# Patient Record
Sex: FEMALE | Race: WHITE | Employment: UNEMPLOYED | URBAN - METROPOLITAN AREA
[De-identification: names, ages, dates, MRNs, and addresses within clinical notes are randomized per-mention and may not be internally consistent; named-entity substitution may affect disease eponyms.]

---

## 2023-07-10 ENCOUNTER — ANCILLARY PROCEDURE (OUTPATIENT)
Dept: OBGYN CLINIC | Age: 33
End: 2023-07-10
Payer: COMMERCIAL

## 2023-07-10 ENCOUNTER — INITIAL PRENATAL (OUTPATIENT)
Dept: OBGYN CLINIC | Age: 33
End: 2023-07-10
Payer: COMMERCIAL

## 2023-07-10 VITALS
DIASTOLIC BLOOD PRESSURE: 74 MMHG | SYSTOLIC BLOOD PRESSURE: 113 MMHG | HEART RATE: 98 BPM | WEIGHT: 216 LBS | BODY MASS INDEX: 35.99 KG/M2 | HEIGHT: 65 IN

## 2023-07-10 DIAGNOSIS — Z34.90 FOURTH PREGNANCY: ICD-10-CM

## 2023-07-10 DIAGNOSIS — Z34.90 PREGNANCY, UNSPECIFIED GESTATIONAL AGE: Primary | ICD-10-CM

## 2023-07-10 DIAGNOSIS — Z3A.08 8 WEEKS GESTATION OF PREGNANCY: ICD-10-CM

## 2023-07-10 DIAGNOSIS — O09.891 HX OF PRETERM DELIVERY, CURRENTLY PREGNANT, FIRST TRIMESTER: ICD-10-CM

## 2023-07-10 PROCEDURE — 99203 OFFICE O/P NEW LOW 30 MIN: CPT | Performed by: OBSTETRICS & GYNECOLOGY

## 2023-07-10 PROCEDURE — 76801 OB US < 14 WKS SINGLE FETUS: CPT | Performed by: OBSTETRICS & GYNECOLOGY

## 2023-07-10 PROCEDURE — 99999 PR OFFICE/OUTPT VISIT,PROCEDURE ONLY: CPT | Performed by: OBSTETRICS & GYNECOLOGY

## 2023-07-10 NOTE — PROGRESS NOTES
[address] FETAL MEDICINE  [facility] [place] [address]  Dept: [phone]  Loc: [phone]     7/10/2023    RE:  Hilda Baron     : 1990   AGE: 29 y.o. Dear Dr. Mendoza Alanis,    Thank you for allowing me to see Hilda Baron. As I'm sure you will recall, Hilda Baron is a 29 y.o. [id number] last menstrual period was 2023. Estimated Date of Delivery: 24 at [address] [place] [address] seen in our office today for the following:    REASON FOR VISIT: Viability    Patient Active Problem List    Diagnosis Date Noted    8 weeks gestation of pregnancy 07/10/2023    Fourth pregnancy 07/10/2023    Hx of  delivery, currently pregnant, first trimester 07/10/2023        PAST HISTORY:  OB History    Para Term  AB Living   4 2 1 1 1 2   SAB IAB Ectopic Molar Multiple Live Births   1         2      # Outcome Date GA Lbr Kentrell/2nd Weight Sex Delivery Anes PTL Lv   4 Current            3  09/15/19 35w0d  5 lb 11 oz (2.58 kg) M Vag-Spont   DIEGO   2 SAB 2019           1 Term 09/29/15   7 lb 5.6 oz (3.334 kg) F Vag-Spont   DIEGO          MEDICAL:  Past Medical History:   Diagnosis Date    Asthma     Infertility, female     Postpartum depression      delivery 9/15/19    Delivered second pregnancy at 35 weeks.  labor     Rh incompatibility     Blood type AB-. SURGICAL:  No past surgical history on file. ALLERGIES:    No Known Allergies      MEDICATIONS:    Current Outpatient Medications   Medication Sig Dispense Refill    Prenatal Vit-Fe Fumarate-FA (PRENATAL 1+1 PO) Take by mouth       No current facility-administered medications for this visit.         Social History     Socioeconomic History    Marital status:      Spouse name: None    Number of children: None    Years of education: None    Highest education level: None   Tobacco Use    Smoking status: Never    Smokeless tobacco: Never   Substance and

## 2023-07-13 ENCOUNTER — INITIAL PRENATAL (OUTPATIENT)
Dept: OBGYN CLINIC | Age: 33
End: 2023-07-13
Payer: COMMERCIAL

## 2023-07-13 VITALS
SYSTOLIC BLOOD PRESSURE: 116 MMHG | HEART RATE: 90 BPM | DIASTOLIC BLOOD PRESSURE: 76 MMHG | WEIGHT: 217 LBS | HEIGHT: 65 IN | BODY MASS INDEX: 36.15 KG/M2

## 2023-07-13 DIAGNOSIS — Z34.81 ENCOUNTER FOR SUPERVISION OF OTHER NORMAL PREGNANCY IN FIRST TRIMESTER: Primary | ICD-10-CM

## 2023-07-13 PROCEDURE — 99203 OFFICE O/P NEW LOW 30 MIN: CPT | Performed by: OBSTETRICS & GYNECOLOGY

## 2023-07-13 PROCEDURE — 81002 URINALYSIS NONAUTO W/O SCOPE: CPT | Performed by: OBSTETRICS & GYNECOLOGY

## 2023-07-13 RX ORDER — PROGESTERONE 200 MG/1
1 CAPSULE ORAL 2 TIMES DAILY
COMMUNITY
End: 2023-07-13

## 2023-07-13 NOTE — PROGRESS NOTES
Initial prenatal visit. Recently moved from [place] no family present here no friends concerned with PPD which she had with her first pregnancy. Last pregnancy P PROM. Both 's. FOB involved. No PMH. Social no TAD. Dates confirmed with 8 weeks 6-day US'  Physical exam as above. Impression IUP 9 weeks 2 days  History of  delivery  History PPD. Plan reviewed first and second trimester changes. Info on mental health resources, OTC meds warning signs  Initial prenatal labs with [facility]ama and [facility]  Declined nuchal  Will order ultrasound next visit for cervical length per M request  Continue prenatals  Reviewed depression and warning signs to call.

## 2023-07-13 NOTE — PROGRESS NOTES
Initial prenatal  No fm  Pt denies lof vag bleeding or contractions  Pap collected labeled and sent to lab

## 2023-07-19 LAB — GYNECOLOGY CYTOLOGY REPORT: NORMAL

## 2023-07-20 LAB
CHLAMYDIA BY THIN PREP: NEGATIVE
HPV SAMPLE: NORMAL
HPV SOURCE: NORMAL
HPV, GENOTYPE 16: NOT DETECTED
HPV, GENOTYPE 18: NOT DETECTED
HPV, HIGH RISK OTHER: NOT DETECTED
HPV, INTERPRETATION: NORMAL
N. GONORRHOEAE DNA, THIN PREP: NEGATIVE

## 2023-07-25 ENCOUNTER — HOSPITAL ENCOUNTER (OUTPATIENT)
Age: 33
Discharge: HOME | End: 2023-07-25
Payer: COMMERCIAL

## 2023-07-25 DIAGNOSIS — Z34.81 ENCOUNTER FOR SUPERVISION OF OTHER NORMAL PREGNANCY IN FIRST TRIMESTER: ICD-10-CM

## 2023-07-25 LAB
ABO + RH BLD: NORMAL
AMPHET UR QL SCN: NEGATIVE
BARBITURATES UR QL SCN: NEGATIVE
BENZODIAZ UR QL: NEGATIVE
BUPRENORPHINE UR QL: NEGATIVE
CANNABINOIDS UR QL SCN: NEGATIVE
COCAINE UR QL SCN: NEGATIVE
ERYTHROCYTE [DISTWIDTH] IN BLOOD BY AUTOMATED COUNT: 13.1 % (ref 11.5–15)
FENTANYL UR QL: NEGATIVE
FERRITIN SERPL-MCNC: 80 NG/ML
HCT VFR BLD AUTO: 42.4 % (ref 34–48)
HGB BLD-MCNC: 14.1 G/DL (ref 11.5–15.5)
MCH RBC QN AUTO: 29.2 PG (ref 26–35)
MCHC RBC AUTO-ENTMCNC: 33.3 G/DL (ref 32–34.5)
MCV RBC AUTO: 87.8 FL (ref 80–99.9)
METHADONE UR QL: NEGATIVE
OPIATES UR QL SCN: NEGATIVE
OXYCODONE UR QL SCN: NEGATIVE
PCP UR QL SCN: NEGATIVE
PLATELET # BLD AUTO: 225 K/UL (ref 130–450)
PMV BLD AUTO: 10.9 FL (ref 7–12)
RBC # BLD AUTO: 4.83 M/UL (ref 3.5–5.5)
TEST INFORMATION: NORMAL
WBC OTHER # BLD: 10 K/UL (ref 4.5–11.5)

## 2023-07-25 PROCEDURE — 82728 ASSAY OF FERRITIN: CPT

## 2023-07-25 PROCEDURE — 86762 RUBELLA ANTIBODY: CPT

## 2023-07-25 PROCEDURE — 86803 HEPATITIS C AB TEST: CPT

## 2023-07-25 PROCEDURE — 85027 COMPLETE CBC AUTOMATED: CPT

## 2023-07-25 PROCEDURE — 80307 DRUG TEST PRSMV CHEM ANLYZR: CPT

## 2023-07-25 PROCEDURE — 86900 BLOOD TYPING SEROLOGIC ABO: CPT

## 2023-07-25 PROCEDURE — 36415 COLL VENOUS BLD VENIPUNCTURE: CPT

## 2023-07-25 PROCEDURE — 87389 HIV-1 AG W/HIV-1&-2 AB AG IA: CPT

## 2023-07-25 PROCEDURE — 86901 BLOOD TYPING SEROLOGIC RH(D): CPT

## 2023-07-25 PROCEDURE — 87340 HEPATITIS B SURFACE AG IA: CPT

## 2023-07-25 PROCEDURE — 82951 GLUCOSE TOLERANCE TEST (GTT): CPT

## 2023-07-26 LAB
AMOUNT GLUCOSE GIVEN: 75 G
COLLECT TIME, 1HR GLUCOSE: NORMAL
COLLECT TIME, 2HR GLUCOSE: NORMAL
COLLECT TIME, FASTING GLUCOSE: NORMAL
GLUCOSE 2H P 100 G GLC PO SERPL-MCNC: 95 MG/DL
GLUCOSE 3H P 100 G GLC PO SERPL-MCNC: 120 MG/DL
GLUCOSE TOLERANCE TEST 2 HOUR: 122 MG/DL

## 2023-07-27 LAB
HBV SURFACE AG SERPL QL IA: NONREACTIVE
HCV AB SERPL QL IA: NONREACTIVE
HIV 1+2 AB+HIV1 P24 AG SERPL QL IA: NONREACTIVE

## 2023-07-28 LAB — RUBV IGG SERPL QL IA: NORMAL IU/ML

## 2023-08-01 LAB
Lab: NORMAL
NTRA 1P36 DELETION SYNDROME POPULATION-BASED RISK TEXT: NORMAL
NTRA 1P36 DELETION SYNDROME RESULT TEXT: NORMAL
NTRA 1P36 DELETION SYNDROME RISK SCORE TEXT: NORMAL
NTRA 22Q11.2 DELETION SYNDROME POPULATION-BASED RISK TEXT: NORMAL
NTRA 22Q11.2 DELETION SYNDROME RESULT TEXT: NORMAL
NTRA 22Q11.2 DELETION SYNDROME RISK SCORE TEXT: NORMAL
NTRA ANGELMAN SYNDROME POPULATION-BASED RISK TEXT: NORMAL
NTRA ANGELMAN SYNDROME RESULT TEXT: NORMAL
NTRA ANGELMAN SYNDROME RISK SCORE TEXT: NORMAL
NTRA CRI-DU-CHAT SYNDROME POPULATION-BASED RISK TEXT: NORMAL
NTRA CRI-DU-CHAT SYNDROME RESULT TEXT: NORMAL
NTRA CRI-DU-CHAT SYNDROME RISK SCORE TEXT: NORMAL
NTRA FETAL FRACTION: NORMAL
NTRA GENDER OF FETUS: NORMAL
NTRA MONOSOMY X AGE-BASED RISK TEXT: NORMAL
NTRA MONOSOMY X RESULT TEXT: NORMAL
NTRA MONOSOMY X RISK SCORE TEXT: NORMAL
NTRA PRADER-WILLI SYNDROME POPULATION-BASED RISK TEXT: NORMAL
NTRA PRADER-WILLI SYNDROME RESULT TEXT: NORMAL
NTRA PRADER-WILLI SYNDROME RISK SCORE TEXT: NORMAL
NTRA TRIPLOIDY RESULT TEXT: NORMAL
NTRA TRISOMY 13 AGE-BASED RISK TEXT: NORMAL
NTRA TRISOMY 13 RESULT TEXT: NORMAL
NTRA TRISOMY 13 RISK SCORE TEXT: NORMAL
NTRA TRISOMY 18 AGE-BASED RISK TEXT: NORMAL
NTRA TRISOMY 18 RESULT TEXT: NORMAL
NTRA TRISOMY 18 RISK SCORE TEXT: NORMAL
NTRA TRISOMY 21 AGE-BASED RISK TEXT: NORMAL
NTRA TRISOMY 21 RESULT TEXT: NORMAL
NTRA TRISOMY 21 RISK SCORE TEXT: NORMAL

## 2023-08-04 LAB
Lab: NEGATIVE
Lab: NORMAL
NTRA ALPHA-THALASSEMIA: NEGATIVE
NTRA BETA-HEMOGLOBINOPATHIES: NEGATIVE
NTRA CANAVAN DISEASE: NEGATIVE
NTRA CYSTIC FIBROSIS: NEGATIVE
NTRA DUCHENNE/BECKER MUSCULAR DYSTROPHY: NEGATIVE
NTRA FAMILIAL DYSAUTONOMIA: NEGATIVE
NTRA FRAGILE X SYNDROME: NEGATIVE
NTRA GALACTOSEMIA: NEGATIVE
NTRA GAUCHER DISEASE: NEGATIVE
NTRA MEDIUM CHAIN ACYL-COA DEHYDROGENASE DEFICIENCY: NEGATIVE
NTRA POLYCYSTIC KIDNEY DISEASE, AUTOSOMAL RECESSIVE: NEGATIVE
NTRA SMITH-LEMLI-OPITZ SYNDROME: NEGATIVE
NTRA SPINAL MUSCULAR ATROPHY: NEGATIVE
NTRA TAY-SACHS DISEASE: NEGATIVE

## 2023-08-10 ENCOUNTER — ROUTINE PRENATAL (OUTPATIENT)
Dept: OBGYN CLINIC | Age: 33
End: 2023-08-10
Payer: COMMERCIAL

## 2023-08-10 VITALS
WEIGHT: 213 LBS | DIASTOLIC BLOOD PRESSURE: 73 MMHG | HEART RATE: 83 BPM | SYSTOLIC BLOOD PRESSURE: 109 MMHG | BODY MASS INDEX: 35.45 KG/M2

## 2023-08-10 DIAGNOSIS — O09.212 HIGH RISK PREGNANCY DUE TO HISTORY OF PRETERM LABOR, SECOND TRIMESTER: Primary | ICD-10-CM

## 2023-08-10 PROBLEM — Z3A.08 8 WEEKS GESTATION OF PREGNANCY: Status: RESOLVED | Noted: 2023-07-10 | Resolved: 2023-08-10

## 2023-08-10 PROCEDURE — 99213 OFFICE O/P EST LOW 20 MIN: CPT | Performed by: OBSTETRICS & GYNECOLOGY

## 2023-08-10 NOTE — PROGRESS NOTES
Initial labs reviewed will need RhoGAM at 28 weeks. Emotionally doing well. No concerns with nausea or vomiting. 16-week ultrasound ordered for cervical length history of  delivery.

## 2023-08-30 ENCOUNTER — ANCILLARY PROCEDURE (OUTPATIENT)
Dept: OBGYN CLINIC | Age: 33
End: 2023-08-30
Payer: COMMERCIAL

## 2023-08-30 ENCOUNTER — ROUTINE PRENATAL (OUTPATIENT)
Dept: OBGYN CLINIC | Age: 33
End: 2023-08-30
Payer: COMMERCIAL

## 2023-08-30 VITALS
DIASTOLIC BLOOD PRESSURE: 77 MMHG | BODY MASS INDEX: 35.28 KG/M2 | WEIGHT: 212 LBS | HEART RATE: 84 BPM | SYSTOLIC BLOOD PRESSURE: 114 MMHG

## 2023-08-30 DIAGNOSIS — Z3A.16 16 WEEKS GESTATION OF PREGNANCY: Primary | ICD-10-CM

## 2023-08-30 DIAGNOSIS — O09.892 HISTORY OF PRETERM DELIVERY, CURRENTLY PREGNANT IN SECOND TRIMESTER: ICD-10-CM

## 2023-08-30 PROCEDURE — 76805 OB US >/= 14 WKS SNGL FETUS: CPT | Performed by: OBSTETRICS & GYNECOLOGY

## 2023-08-30 PROCEDURE — 99213 OFFICE O/P EST LOW 20 MIN: CPT | Performed by: OBSTETRICS & GYNECOLOGY

## 2023-08-30 PROCEDURE — 76817 TRANSVAGINAL US OBSTETRIC: CPT | Performed by: OBSTETRICS & GYNECOLOGY

## 2023-08-30 NOTE — PROGRESS NOTES
Patient is here today for ultrasound. Had some slight spotting about a week ago when she went to the bathroom. Denies bleeding now. No cramping.

## 2023-09-13 ENCOUNTER — ANCILLARY PROCEDURE (OUTPATIENT)
Dept: OBGYN CLINIC | Age: 33
End: 2023-09-13
Payer: COMMERCIAL

## 2023-09-13 ENCOUNTER — ROUTINE PRENATAL (OUTPATIENT)
Dept: OBGYN CLINIC | Age: 33
End: 2023-09-13
Payer: COMMERCIAL

## 2023-09-13 VITALS
DIASTOLIC BLOOD PRESSURE: 77 MMHG | HEART RATE: 99 BPM | SYSTOLIC BLOOD PRESSURE: 123 MMHG | BODY MASS INDEX: 35.45 KG/M2 | WEIGHT: 213 LBS

## 2023-09-13 DIAGNOSIS — O09.892 HISTORY OF PRETERM DELIVERY, CURRENTLY PREGNANT IN SECOND TRIMESTER: ICD-10-CM

## 2023-09-13 DIAGNOSIS — Z3A.18 18 WEEKS GESTATION OF PREGNANCY: Primary | ICD-10-CM

## 2023-09-13 LAB
GLUCOSE URINE, POC: NORMAL
PROTEIN UA: NEGATIVE

## 2023-09-13 PROCEDURE — 99213 OFFICE O/P EST LOW 20 MIN: CPT | Performed by: OBSTETRICS & GYNECOLOGY

## 2023-09-13 PROCEDURE — 99999 PR OFFICE/OUTPT VISIT,PROCEDURE ONLY: CPT | Performed by: OBSTETRICS & GYNECOLOGY

## 2023-09-13 PROCEDURE — 81002 URINALYSIS NONAUTO W/O SCOPE: CPT | Performed by: OBSTETRICS & GYNECOLOGY

## 2023-09-13 PROCEDURE — 76817 TRANSVAGINAL US OBSTETRIC: CPT | Performed by: OBSTETRICS & GYNECOLOGY

## 2023-09-13 PROCEDURE — 76815 OB US LIMITED FETUS(S): CPT | Performed by: OBSTETRICS & GYNECOLOGY

## 2023-09-13 NOTE — PROGRESS NOTES
Patient is here today for 2 wk f/u. Denies any vaginal bleeding, or leakage of fluids. Cramping on and off.

## 2023-09-13 NOTE — PROGRESS NOTES
[address] FETAL MEDICINE  [facility] [address]  Dept: [phone]  Loc: [phone]     2023    RE:  Nancie Oscar     : 1990   AGE: 29 y.o. Dear Dr. Tiff Coffey,    Thank you for allowing me to see Nancie Oscar. As I'm sure you will recall, Nancie Oscar is a 29 y.o. [id number] last menstrual period was 2023. Estimated Date of Delivery: 24 at 18w1d seen in our office today for the following:    REASON FOR VISIT: Cervical Length    Patient Active Problem List    Diagnosis Date Noted    18 weeks gestation of pregnancy 2023    Fourth pregnancy 07/10/2023    Hx of  delivery, currently pregnant, second trimester 07/10/2023        PAST HISTORY:  OB History    Para Term  AB Living   4 2 1 1 1 2   SAB IAB Ectopic Molar Multiple Live Births   1         2      # Outcome Date GA Lbr Kentrell/2nd Weight Sex Delivery Anes PTL Lv   4 Current            3  09/15/19 35w0d  5 lb 11 oz (2.58 kg) M Vag-Spont   DIEGO   2 SAB 2019           1 Term 09/29/15   7 lb 5.6 oz (3.334 kg) F Vag-Spont   DIEGO      Obstetric Comments   No complications          MEDICAL:  Past Medical History:   Diagnosis Date    Asthma     Postpartum depression      delivery 9/15/19    Delivered second pregnancy at 35 weeks. SURGICAL:  No past surgical history on file. ALLERGIES:    No Known Allergies      MEDICATIONS:    Current Outpatient Medications   Medication Sig Dispense Refill    Probiotic Product (PROBIOTIC-10 PO) Take by mouth      Prenatal Vit-Fe Fumarate-FA (PRENATAL 1+1 PO) Take by mouth       No current facility-administered medications for this visit.         Social History     Socioeconomic History    Marital status:    Tobacco Use    Smoking status: Never    Smokeless tobacco: Never   Vaping Use    Vaping Use: Never used   Substance and Sexual Activity    Alcohol use: Not Currently

## 2023-09-14 ENCOUNTER — ROUTINE PRENATAL (OUTPATIENT)
Age: 33
End: 2023-09-14

## 2023-09-14 VITALS
HEART RATE: 92 BPM | SYSTOLIC BLOOD PRESSURE: 116 MMHG | BODY MASS INDEX: 35.28 KG/M2 | WEIGHT: 212 LBS | DIASTOLIC BLOOD PRESSURE: 73 MMHG

## 2023-09-14 DIAGNOSIS — Z34.82 ENCOUNTER FOR SUPERVISION OF OTHER NORMAL PREGNANCY IN SECOND TRIMESTER: Primary | ICD-10-CM

## 2023-09-14 NOTE — PROGRESS NOTES
No FM yet. Emotionally doing better. No BLD or LOF. MFM US cervix is long and closed. Has anatomy US in 2 weeks. Unable to void. Requesting note for breast pump.

## 2023-09-27 ENCOUNTER — ROUTINE PRENATAL (OUTPATIENT)
Dept: OBGYN CLINIC | Age: 33
End: 2023-09-27
Payer: COMMERCIAL

## 2023-09-27 ENCOUNTER — ANCILLARY PROCEDURE (OUTPATIENT)
Dept: OBGYN CLINIC | Age: 33
End: 2023-09-27
Payer: COMMERCIAL

## 2023-09-27 VITALS
DIASTOLIC BLOOD PRESSURE: 76 MMHG | HEART RATE: 87 BPM | SYSTOLIC BLOOD PRESSURE: 114 MMHG | WEIGHT: 214 LBS | BODY MASS INDEX: 35.61 KG/M2

## 2023-09-27 DIAGNOSIS — Z3A.20 20 WEEKS GESTATION OF PREGNANCY: Primary | ICD-10-CM

## 2023-09-27 DIAGNOSIS — O09.892 HISTORY OF PRETERM DELIVERY, CURRENTLY PREGNANT IN SECOND TRIMESTER: ICD-10-CM

## 2023-09-27 LAB
GLUCOSE URINE, POC: NORMAL
PROTEIN UA: NEGATIVE

## 2023-09-27 PROCEDURE — 99213 OFFICE O/P EST LOW 20 MIN: CPT | Performed by: OBSTETRICS & GYNECOLOGY

## 2023-09-27 PROCEDURE — 81002 URINALYSIS NONAUTO W/O SCOPE: CPT | Performed by: OBSTETRICS & GYNECOLOGY

## 2023-09-27 PROCEDURE — 76817 TRANSVAGINAL US OBSTETRIC: CPT | Performed by: OBSTETRICS & GYNECOLOGY

## 2023-09-27 PROCEDURE — 76811 OB US DETAILED SNGL FETUS: CPT | Performed by: OBSTETRICS & GYNECOLOGY

## 2023-09-27 NOTE — PROGRESS NOTES
Patient is here today for anatomy scan. Denies any vaginal bleeding, cramping, or leakage of fluids. Patient reports slight fetal movement.

## 2023-10-11 ENCOUNTER — ROUTINE PRENATAL (OUTPATIENT)
Dept: OBGYN CLINIC | Age: 33
End: 2023-10-11
Payer: COMMERCIAL

## 2023-10-11 ENCOUNTER — ANCILLARY PROCEDURE (OUTPATIENT)
Dept: OBGYN CLINIC | Age: 33
End: 2023-10-11
Payer: COMMERCIAL

## 2023-10-11 VITALS
DIASTOLIC BLOOD PRESSURE: 67 MMHG | WEIGHT: 215 LBS | BODY MASS INDEX: 35.78 KG/M2 | HEART RATE: 88 BPM | SYSTOLIC BLOOD PRESSURE: 109 MMHG

## 2023-10-11 DIAGNOSIS — O09.892 HISTORY OF PRETERM DELIVERY, CURRENTLY PREGNANT IN SECOND TRIMESTER: ICD-10-CM

## 2023-10-11 DIAGNOSIS — Z3A.22 22 WEEKS GESTATION OF PREGNANCY: Primary | ICD-10-CM

## 2023-10-11 PROCEDURE — 76817 TRANSVAGINAL US OBSTETRIC: CPT | Performed by: OBSTETRICS & GYNECOLOGY

## 2023-10-11 PROCEDURE — 76815 OB US LIMITED FETUS(S): CPT | Performed by: OBSTETRICS & GYNECOLOGY

## 2023-10-11 PROCEDURE — 99213 OFFICE O/P EST LOW 20 MIN: CPT | Performed by: OBSTETRICS & GYNECOLOGY

## 2023-10-11 PROCEDURE — 99999 PR OFFICE/OUTPT VISIT,PROCEDURE ONLY: CPT | Performed by: OBSTETRICS & GYNECOLOGY

## 2023-10-11 NOTE — PROGRESS NOTES
[address] FETAL MEDICINE  [facility] [address]  Dept: [phone]  Loc: [phone]     10/11/2023    RE:  Kalani Sinha     : 1990   AGE: 29 y.o. Dear Dr. Scot Fuentes,    Thank you for allowing me to see Kalani Sinha. As I'm sure you will recall, Kalani Sinha is a 29 y.o. [id number] last menstrual period was 2023. Estimated Date of Delivery: 24 at 22w1d seen in our office today for the following:    REASON FOR VISIT: Cervical Length    Patient Active Problem List    Diagnosis Date Noted    22 weeks gestation of pregnancy 2023    Fourth pregnancy 07/10/2023    Hx of  delivery, currently pregnant, second trimester 07/10/2023        PAST HISTORY:  OB History    Para Term  AB Living   4 2 1 1 1 2   SAB IAB Ectopic Molar Multiple Live Births   1         2      # Outcome Date GA Lbr Kentrell/2nd Weight Sex Delivery Anes PTL Lv   4 Current            3  09/15/19 35w0d  5 lb 11 oz (2.58 kg) M Vag-Spont   DIEGO   2 SAB 2019           1 Term 09/29/15   7 lb 5.6 oz (3.334 kg) F Vag-Spont   DIEGO      Obstetric Comments   No complications          MEDICAL:  Past Medical History:   Diagnosis Date    Asthma     Postpartum depression      delivery 9/15/19    Delivered second pregnancy at 35 weeks. SURGICAL:  No past surgical history on file. ALLERGIES:    No Known Allergies      MEDICATIONS:    Current Outpatient Medications   Medication Sig Dispense Refill    Probiotic Product (PROBIOTIC-10 PO) Take by mouth      Prenatal Vit-Fe Fumarate-FA (PRENATAL 1+1 PO) Take by mouth       No current facility-administered medications for this visit.         Social History     Socioeconomic History    Marital status:    Tobacco Use    Smoking status: Never    Smokeless tobacco: Never   Vaping Use    Vaping Use: Never used   Substance and Sexual Activity    Alcohol use: Not

## 2023-10-12 ENCOUNTER — ROUTINE PRENATAL (OUTPATIENT)
Age: 33
End: 2023-10-12

## 2023-10-12 VITALS
SYSTOLIC BLOOD PRESSURE: 101 MMHG | WEIGHT: 215 LBS | HEART RATE: 98 BPM | DIASTOLIC BLOOD PRESSURE: 71 MMHG | BODY MASS INDEX: 35.78 KG/M2

## 2023-10-12 DIAGNOSIS — Z34.82 ENCOUNTER FOR SUPERVISION OF OTHER NORMAL PREGNANCY IN SECOND TRIMESTER: Primary | ICD-10-CM

## 2023-10-12 LAB
GLUCOSE URINE, POC: NEGATIVE
PROTEIN UA: NEGATIVE

## 2023-10-12 ASSESSMENT — PATIENT HEALTH QUESTIONNAIRE - PHQ9
SUM OF ALL RESPONSES TO PHQ QUESTIONS 1-9: 0
1. LITTLE INTEREST OR PLEASURE IN DOING THINGS: 0
SUM OF ALL RESPONSES TO PHQ9 QUESTIONS 1 & 2: 0
SUM OF ALL RESPONSES TO PHQ QUESTIONS 1-9: 0
2. FEELING DOWN, DEPRESSED OR HOPELESS: 0
SUM OF ALL RESPONSES TO PHQ QUESTIONS 1-9: 0
SUM OF ALL RESPONSES TO PHQ QUESTIONS 1-9: 0

## 2023-10-12 NOTE — PROGRESS NOTES
Patient alert and pleasant with complaints of small rash like area right breast, still having nausea and right sciatic pain. Here today for prenatal visit. Unable to void at this time  Discharge instructions have been discussed with the patient. Patient advised to call our office with any questions or concerns. Voiced understanding.

## 2023-10-25 ENCOUNTER — ANCILLARY PROCEDURE (OUTPATIENT)
Dept: OBGYN CLINIC | Age: 33
End: 2023-10-25
Payer: COMMERCIAL

## 2023-10-25 ENCOUNTER — ROUTINE PRENATAL (OUTPATIENT)
Dept: OBGYN CLINIC | Age: 33
End: 2023-10-25
Payer: COMMERCIAL

## 2023-10-25 VITALS
HEART RATE: 91 BPM | BODY MASS INDEX: 35.28 KG/M2 | SYSTOLIC BLOOD PRESSURE: 106 MMHG | WEIGHT: 212 LBS | DIASTOLIC BLOOD PRESSURE: 73 MMHG

## 2023-10-25 DIAGNOSIS — Z3A.24 24 WEEKS GESTATION OF PREGNANCY: Primary | ICD-10-CM

## 2023-10-25 DIAGNOSIS — O09.892 HISTORY OF PRETERM DELIVERY, CURRENTLY PREGNANT IN SECOND TRIMESTER: ICD-10-CM

## 2023-10-25 LAB
GLUCOSE URINE, POC: NORMAL
PROTEIN UA: NEGATIVE

## 2023-10-25 PROCEDURE — 81002 URINALYSIS NONAUTO W/O SCOPE: CPT | Performed by: OBSTETRICS & GYNECOLOGY

## 2023-10-25 PROCEDURE — 76816 OB US FOLLOW-UP PER FETUS: CPT | Performed by: OBSTETRICS & GYNECOLOGY

## 2023-10-25 PROCEDURE — 99999 PR OFFICE/OUTPT VISIT,PROCEDURE ONLY: CPT | Performed by: OBSTETRICS & GYNECOLOGY

## 2023-10-25 PROCEDURE — 99213 OFFICE O/P EST LOW 20 MIN: CPT | Performed by: OBSTETRICS & GYNECOLOGY

## 2023-10-25 NOTE — PROGRESS NOTES
[address] FETAL MEDICINE  [facility] Final [facility] [address]  Dept: [phone]  Loc: [phone]     10/25/2023    RE:  Herson Malcolm     : 1990   AGE: 34 y.o. Dear Dr. Jessica Garcia,    Thank you for allowing me to see Herson Malcolm. As I'm sure you will recall, Herson Malcolm is a 34 y.o. [id number]'s last menstrual period was 2023. Estimated Date of Delivery: 24 at 24w1d seen in our office today for the following:    REASON FOR VISIT: Growth and cervical length    Patient Active Problem List    Diagnosis Date Noted    24 weeks gestation of pregnancy 2023    Fourth pregnancy 07/10/2023    Hx of  delivery, currently pregnant, second trimester 07/10/2023        PAST HISTORY:  OB History    Para Term  AB Living   4 2 1 1 1 2   SAB IAB Ectopic Molar Multiple Live Births   1         2      # Outcome Date GA Lbr Kentrell/2nd Weight Sex Delivery Anes PTL Lv   4 Current            3  09/15/19 35w0d  2.58 kg (5 lb 11 oz) M Vag-Spont   DIEGO   2 SAB 2019           1 Term 09/29/15   3.334 kg (7 lb 5.6 oz) F Vag-Spont   DIEGO      Obstetric Comments   No complications          MEDICAL:  Past Medical History:   Diagnosis Date    Asthma     Postpartum depression      delivery 9/15/19    Delivered second pregnancy at 35 weeks. SURGICAL:  No past surgical history on file. ALLERGIES:    No Known Allergies      MEDICATIONS:    Current Outpatient Medications   Medication Sig Dispense Refill    Probiotic Product (PROBIOTIC-10 PO) Take by mouth      Prenatal Vit-Fe Fumarate-FA (PRENATAL 1+1 PO) Take by mouth       No current facility-administered medications for this visit.         Social History     Socioeconomic History    Marital status:    Tobacco Use    Smoking status: Never    Smokeless tobacco: Never   Vaping Use    Vaping Use: Never used   Substance and Sexual Activity    Alcohol use:
Patient is here today for 4 wk f/u. Denies any vaginal bleeding, or leakage of fluids. Cramping on and off. Patient reports good fetal movement.
fever

## 2023-11-09 ENCOUNTER — ROUTINE PRENATAL (OUTPATIENT)
Age: 33
End: 2023-11-09

## 2023-11-09 VITALS
HEART RATE: 90 BPM | SYSTOLIC BLOOD PRESSURE: 113 MMHG | BODY MASS INDEX: 35.61 KG/M2 | DIASTOLIC BLOOD PRESSURE: 76 MMHG | WEIGHT: 214 LBS

## 2023-11-09 DIAGNOSIS — Z34.80 SUPERVISION OF OTHER NORMAL PREGNANCY, ANTEPARTUM: Primary | ICD-10-CM

## 2023-11-09 DIAGNOSIS — O09.90 HIGH RISK PREGNANCY, ANTEPARTUM: Primary | ICD-10-CM

## 2023-11-09 PROBLEM — Z3A.24 24 WEEKS GESTATION OF PREGNANCY: Status: RESOLVED | Noted: 2023-09-13 | Resolved: 2023-11-09

## 2023-11-09 NOTE — PROGRESS NOTES
Good FM. No BLD or LOF last ultrasound reviewed cervical length normal follow-up again at 32 weeks.   Will order RhoGAM.  Offer Tdap next visit

## 2023-11-29 ENCOUNTER — ANCILLARY PROCEDURE (OUTPATIENT)
Dept: OBGYN CLINIC | Age: 33
End: 2023-11-29
Payer: COMMERCIAL

## 2023-11-29 ENCOUNTER — HOSPITAL ENCOUNTER (OUTPATIENT)
Dept: INFUSION THERAPY | Age: 33
Setting detail: INFUSION SERIES
Discharge: HOME | End: 2023-11-29
Payer: COMMERCIAL

## 2023-11-29 ENCOUNTER — HOSPITAL ENCOUNTER (OUTPATIENT)
Age: 33
Discharge: HOME | End: 2023-11-29
Payer: COMMERCIAL

## 2023-11-29 ENCOUNTER — TELEPHONE (OUTPATIENT)
Dept: OBGYN | Age: 33
End: 2023-11-29

## 2023-11-29 ENCOUNTER — ROUTINE PRENATAL (OUTPATIENT)
Dept: OBGYN CLINIC | Age: 33
End: 2023-11-29
Payer: COMMERCIAL

## 2023-11-29 VITALS
WEIGHT: 216 LBS | SYSTOLIC BLOOD PRESSURE: 108 MMHG | DIASTOLIC BLOOD PRESSURE: 74 MMHG | BODY MASS INDEX: 35.94 KG/M2 | HEART RATE: 98 BPM

## 2023-11-29 VITALS
HEART RATE: 91 BPM | DIASTOLIC BLOOD PRESSURE: 74 MMHG | RESPIRATION RATE: 20 BRPM | SYSTOLIC BLOOD PRESSURE: 122 MMHG | TEMPERATURE: 97.9 F | OXYGEN SATURATION: 100 %

## 2023-11-29 DIAGNOSIS — O09.892 HISTORY OF PRETERM DELIVERY, CURRENTLY PREGNANT IN SECOND TRIMESTER: ICD-10-CM

## 2023-11-29 DIAGNOSIS — Z34.80 SUPERVISION OF OTHER NORMAL PREGNANCY, ANTEPARTUM: ICD-10-CM

## 2023-11-29 DIAGNOSIS — Z67.91 RH NEGATIVE STATE IN ANTEPARTUM PERIOD, THIRD TRIMESTER: ICD-10-CM

## 2023-11-29 DIAGNOSIS — O26.893 RH NEGATIVE STATE IN ANTEPARTUM PERIOD, THIRD TRIMESTER: ICD-10-CM

## 2023-11-29 DIAGNOSIS — R73.09 ABNORMAL GLUCOSE TOLERANCE TEST: Primary | ICD-10-CM

## 2023-11-29 DIAGNOSIS — Z3A.29 29 WEEKS GESTATION OF PREGNANCY: Primary | ICD-10-CM

## 2023-11-29 DIAGNOSIS — O09.90 HIGH RISK PREGNANCY, ANTEPARTUM: ICD-10-CM

## 2023-11-29 LAB
AMOUNT GLUCOSE GIVEN: 50 G
COLLECT TIME, 1HR GLUCOSE: 1028
ERYTHROCYTE [DISTWIDTH] IN BLOOD BY AUTOMATED COUNT: 13.2 % (ref 11.5–15)
FERRITIN SERPL-MCNC: 19 NG/ML
GLUCOSE 3H P 100 G GLC PO SERPL-MCNC: 153 MG/DL
GLUCOSE URINE, POC: NORMAL
HCT VFR BLD AUTO: 39.5 % (ref 34–48)
HGB BLD-MCNC: 13.1 G/DL (ref 11.5–15.5)
MCH RBC QN AUTO: 29.9 PG (ref 26–35)
MCHC RBC AUTO-ENTMCNC: 33.2 G/DL (ref 32–34.5)
MCV RBC AUTO: 90.2 FL (ref 80–99.9)
PLATELET # BLD AUTO: 203 K/UL (ref 130–450)
PMV BLD AUTO: 11.2 FL (ref 7–12)
PROTEIN UA: NEGATIVE
RBC # BLD AUTO: 4.38 M/UL (ref 3.5–5.5)
WBC OTHER # BLD: 11.3 K/UL (ref 4.5–11.5)

## 2023-11-29 PROCEDURE — 86850 RBC ANTIBODY SCREEN: CPT

## 2023-11-29 PROCEDURE — 86592 SYPHILIS TEST NON-TREP QUAL: CPT

## 2023-11-29 PROCEDURE — 76816 OB US FOLLOW-UP PER FETUS: CPT | Performed by: OBSTETRICS & GYNECOLOGY

## 2023-11-29 PROCEDURE — 85027 COMPLETE CBC AUTOMATED: CPT

## 2023-11-29 PROCEDURE — 82728 ASSAY OF FERRITIN: CPT

## 2023-11-29 PROCEDURE — 96372 THER/PROPH/DIAG INJ SC/IM: CPT

## 2023-11-29 PROCEDURE — 86901 BLOOD TYPING SEROLOGIC RH(D): CPT

## 2023-11-29 PROCEDURE — 82947 ASSAY GLUCOSE BLOOD QUANT: CPT

## 2023-11-29 PROCEDURE — 99213 OFFICE O/P EST LOW 20 MIN: CPT | Performed by: OBSTETRICS & GYNECOLOGY

## 2023-11-29 PROCEDURE — 36415 COLL VENOUS BLD VENIPUNCTURE: CPT

## 2023-11-29 PROCEDURE — 81002 URINALYSIS NONAUTO W/O SCOPE: CPT | Performed by: OBSTETRICS & GYNECOLOGY

## 2023-11-29 PROCEDURE — 86900 BLOOD TYPING SEROLOGIC ABO: CPT

## 2023-11-29 PROCEDURE — 99999 PR OFFICE/OUTPT VISIT,PROCEDURE ONLY: CPT | Performed by: OBSTETRICS & GYNECOLOGY

## 2023-11-29 PROCEDURE — 6360000002 HC RX W HCPCS: Performed by: OBSTETRICS & GYNECOLOGY

## 2023-11-29 RX ADMIN — HUMAN RHO(D) IMMUNE GLOBULIN 300 MCG: 300 INJECTION, SOLUTION INTRAMUSCULAR at 10:54

## 2023-11-29 NOTE — PROGRESS NOTES
[address] FETAL MEDICINE  [facility] [address]  Dept: [phone]  Loc: [phone]     2023    RE:  Nicholas Quinonez     : 1990   AGE: 34 y.o. Dear Dr. Lissa Paris,    Thank you for allowing me to see Nicholas Quinonez. As I'm sure you will recall, Nicholas Quinonez is a 34 y.o. [id number]'s last menstrual period was 2023. Estimated Date of Delivery: 24 at 29w1d seen in our office today for the following:    REASON FOR VISIT: Growth     Patient Active Problem List    Diagnosis Date Noted    29 weeks gestation of pregnancy 2023    Rh negative state in antepartum period, third trimester 2023    BMI 35.0-35.9,adult 2023    Fourth pregnancy 07/10/2023    Hx of  delivery, currently pregnant, second trimester 07/10/2023        PAST HISTORY:  OB History    Para Term  AB Living   4 2 1 1 1 2   SAB IAB Ectopic Molar Multiple Live Births   1         2      # Outcome Date GA Lbr Kentrell/2nd Weight Sex Delivery Anes PTL Lv   4 Current            3  09/15/19 35w0d  2.58 kg (5 lb 11 oz) M Vag-Spont   DIEGO   2 SAB 2019           1 Term 09/29/15   3.334 kg (7 lb 5.6 oz) F Vag-Spont   DIEGO      Obstetric Comments   No complications          MEDICAL:  Past Medical History:   Diagnosis Date    Asthma     Postpartum depression      delivery 9/15/19    Delivered second pregnancy at 35 weeks. SURGICAL:  No past surgical history on file. ALLERGIES:    No Known Allergies      MEDICATIONS:    Current Outpatient Medications   Medication Sig Dispense Refill    Probiotic Product (PROBIOTIC-10 PO) Take by mouth      Prenatal Vit-Fe Fumarate-FA (PRENATAL 1+1 PO) Take by mouth       No current facility-administered medications for this visit.         Social History     Socioeconomic History    Marital status:    Tobacco Use    Smoking status: Never    Smokeless tobacco: Never

## 2023-11-29 NOTE — DISCHARGE INSTRUCTIONS
Learning About Rh Immunoglobulin Shots  Introduction     An Rh immunoglobulin shot is given to pregnant women who have Rh-negative blood. You may have Rh-negative blood, and your baby may have Rh-positive blood. If the two types of blood mix, your body will make antibodies. This is called Rh sensitization. Most of the time, this is not a problem the first time you're pregnant. But it could cause problems in future pregnancies. This shot keeps your body from making the antibodies. You get the shot around 28 weeks of pregnancy. After the birth, your baby's blood is tested. If the blood is Rh positive, you will get another shot. You may also get the shot if you have vaginal bleeding while you are pregnant or if you have a miscarriage. These shots protect future pregnancies. Women with Rh negative blood will need this shot each time they get pregnant. Example  Rh immunoglobulin (HypRho-D, MICRhoGAM, and RhoGAM)  Possible side effects  Rare side effects may include:  Some mild pain where you got the shot. A slight fever. An allergic reaction. You may have other side effects not listed here. Check the information that comes with your medicine. What to know about taking this medicine  You may need more than one shot. You may need the shot again:  After amniocentesis, fetal blood sampling, or chorionic villus sampling tests. If you have bleeding in your second or third trimester. After turning of a breech baby. After an injury to the belly while you are pregnant. After a miscarriage or an . Before or right after treatment for an ectopic or a partial molar pregnancy. Tell your doctor if you have any allergies or have had a bad response to medicines in the past.  If you get this shot within 3 months of getting a live-virus vaccine, the vaccine may not work. Your doctor will tell you if you need more vaccine. Check with your doctor or pharmacist before you use any other medicines.  This includes

## 2023-11-29 NOTE — PROGRESS NOTES
Patient is here today for growth. Patient has been having an increase in nausea and vomiting. Denies any vaginal bleeding, cramping, or leakage of fluids. Patient reports good fetal movement.

## 2023-11-29 NOTE — TELEPHONE ENCOUNTER
----- Message from Sabrina Pratt DO sent at 11/29/2023 12:38 PM EST -----  Her 1 hour glucose tolerance test is abnormal I am placing an order for a 3-hour glucose tolerance test this is a fasting test for 8 hours.

## 2023-11-30 ENCOUNTER — ROUTINE PRENATAL (OUTPATIENT)
Age: 33
End: 2023-11-30
Payer: COMMERCIAL

## 2023-11-30 VITALS
BODY MASS INDEX: 35.78 KG/M2 | SYSTOLIC BLOOD PRESSURE: 106 MMHG | DIASTOLIC BLOOD PRESSURE: 69 MMHG | HEART RATE: 102 BPM | WEIGHT: 215 LBS

## 2023-11-30 DIAGNOSIS — Z34.83 ENCOUNTER FOR SUPERVISION OF OTHER NORMAL PREGNANCY IN THIRD TRIMESTER: Primary | ICD-10-CM

## 2023-11-30 LAB
ABO + RH BLD: NORMAL
BLOOD GROUP ANTIBODIES SERPL: NEGATIVE
COMPONENT: NORMAL
COMPONENT: NORMAL
GLUCOSE URINE, POC: NORMAL
HISTORY CHECK: NORMAL
Lab: 1
PROTEIN UA: NEGATIVE
RPR SER QL: NONREACTIVE
STATUS OF UNITS: NORMAL
TRANSFUSION STATUS: NORMAL
UNIT DIVISION: 0
UNIT NUMBER: NORMAL

## 2023-11-30 PROCEDURE — 0502F SUBSEQUENT PRENATAL CARE: CPT | Performed by: OBSTETRICS & GYNECOLOGY

## 2023-11-30 PROCEDURE — 81002 URINALYSIS NONAUTO W/O SCOPE: CPT | Performed by: OBSTETRICS & GYNECOLOGY

## 2023-11-30 NOTE — PROGRESS NOTES
Patient is here today for prenatal visit. Has notified an increase in nausea and vomiting. Denies any vaginal bleeding, cramping, or leakage of fluids. Patient reports good fetal movement.

## 2023-12-04 ENCOUNTER — TELEPHONE (OUTPATIENT)
Age: 33
End: 2023-12-04

## 2023-12-04 NOTE — TELEPHONE ENCOUNTER
Patient called and left VM on clinical line stating she was having symptoms she wasn't sure were normal. Called pt who stated she was feeling light headed and dizzy however laid down and is no longer feeling any symptoms at this time. Pt is unsure if this could be related to low iron. States she has no way to check her blood pressure and is unsure if its a blood pressure issue.

## 2023-12-14 ENCOUNTER — ROUTINE PRENATAL (OUTPATIENT)
Age: 33
End: 2023-12-14

## 2023-12-14 VITALS
BODY MASS INDEX: 35.53 KG/M2 | HEART RATE: 99 BPM | DIASTOLIC BLOOD PRESSURE: 76 MMHG | WEIGHT: 213.5 LBS | SYSTOLIC BLOOD PRESSURE: 110 MMHG

## 2023-12-14 DIAGNOSIS — Z34.83 ENCOUNTER FOR SUPERVISION OF OTHER NORMAL PREGNANCY IN THIRD TRIMESTER: Primary | ICD-10-CM

## 2023-12-14 LAB
GLUCOSE URINE, POC: NORMAL
PROTEIN UA: POSITIVE

## 2023-12-14 RX ORDER — ONDANSETRON 4 MG/1
4 TABLET, FILM COATED ORAL 3 TIMES DAILY PRN
Qty: 30 TABLET | Refills: 0 | Status: SHIPPED | OUTPATIENT
Start: 2023-12-14

## 2023-12-14 NOTE — PROGRESS NOTES
Good FM. No BLD or LOF. Unable to do 3-hour GTT secondary to nausea and vomiting. Has nausea and vomiting 2-3 times a day. Would like prescription Rx.   Tdap 32-week ultrasound order

## 2023-12-14 NOTE — PROGRESS NOTES
Patient presents for routine prenatal visit. Reports good fetal movement. Denies bleeding, cramping or leaking of fluid.  Would like to discuss the 3hour GTT as she is struggling with morning sickness still at this time and concerned she won't be able to fast.

## 2023-12-28 ENCOUNTER — ROUTINE PRENATAL (OUTPATIENT)
Age: 33
End: 2023-12-28
Payer: COMMERCIAL

## 2023-12-28 VITALS
SYSTOLIC BLOOD PRESSURE: 113 MMHG | BODY MASS INDEX: 35.11 KG/M2 | WEIGHT: 211 LBS | HEART RATE: 90 BPM | DIASTOLIC BLOOD PRESSURE: 73 MMHG

## 2023-12-28 DIAGNOSIS — Z34.83 ENCOUNTER FOR SUPERVISION OF OTHER NORMAL PREGNANCY IN THIRD TRIMESTER: ICD-10-CM

## 2023-12-28 DIAGNOSIS — O09.93 ENCOUNTER FOR SUPERVISION OF HIGH RISK PREGNANCY IN THIRD TRIMESTER, ANTEPARTUM: Primary | ICD-10-CM

## 2023-12-28 LAB
GLUCOSE URINE, POC: NORMAL
PROTEIN UA: NEGATIVE

## 2023-12-28 PROCEDURE — 0502F SUBSEQUENT PRENATAL CARE: CPT | Performed by: OBSTETRICS & GYNECOLOGY

## 2023-12-28 PROCEDURE — 81002 URINALYSIS NONAUTO W/O SCOPE: CPT | Performed by: OBSTETRICS & GYNECOLOGY

## 2023-12-28 RX ORDER — ONDANSETRON 4 MG/1
4 TABLET, FILM COATED ORAL 3 TIMES DAILY PRN
Qty: 30 TABLET | Refills: 0 | Status: SHIPPED | OUTPATIENT
Start: 2023-12-28

## 2023-12-28 NOTE — PROGRESS NOTES
Good FM. No BLD or LOF. Zofran was helping requesting refill. Has not had hemoglobin A1c done yet.   Growth ultrasound 1-3-2024

## 2023-12-28 NOTE — PROGRESS NOTES
Patient is here today for 2 wk prenatal. Denies any vaginal bleeding, cramping, or leakage of fluids. Patient reports good fetal movement.

## 2023-12-30 ENCOUNTER — HOSPITAL ENCOUNTER (OUTPATIENT)
Age: 33
Discharge: HOME | End: 2023-12-30
Payer: COMMERCIAL

## 2023-12-30 DIAGNOSIS — Z34.83 ENCOUNTER FOR SUPERVISION OF OTHER NORMAL PREGNANCY IN THIRD TRIMESTER: ICD-10-CM

## 2023-12-30 LAB — HBA1C MFR BLD: 5.4 % (ref 4–5.6)

## 2023-12-30 PROCEDURE — 83036 HEMOGLOBIN GLYCOSYLATED A1C: CPT

## 2023-12-30 PROCEDURE — 36415 COLL VENOUS BLD VENIPUNCTURE: CPT

## 2024-01-03 ENCOUNTER — ANCILLARY PROCEDURE (OUTPATIENT)
Dept: OBGYN CLINIC | Age: 34
End: 2024-01-03
Payer: COMMERCIAL

## 2024-01-03 ENCOUNTER — ROUTINE PRENATAL (OUTPATIENT)
Dept: OBGYN CLINIC | Age: 34
End: 2024-01-03
Payer: COMMERCIAL

## 2024-01-03 VITALS
DIASTOLIC BLOOD PRESSURE: 71 MMHG | WEIGHT: 212 LBS | HEART RATE: 91 BPM | SYSTOLIC BLOOD PRESSURE: 108 MMHG | BODY MASS INDEX: 35.28 KG/M2

## 2024-01-03 DIAGNOSIS — Z3A.34 34 WEEKS GESTATION OF PREGNANCY: Primary | ICD-10-CM

## 2024-01-03 LAB
GLUCOSE URINE, POC: NORMAL
PROTEIN UA: NEGATIVE

## 2024-01-03 PROCEDURE — 81002 URINALYSIS NONAUTO W/O SCOPE: CPT | Performed by: OBSTETRICS & GYNECOLOGY

## 2024-01-03 PROCEDURE — 76816 OB US FOLLOW-UP PER FETUS: CPT | Performed by: OBSTETRICS & GYNECOLOGY

## 2024-01-03 PROCEDURE — 99213 OFFICE O/P EST LOW 20 MIN: CPT | Performed by: OBSTETRICS & GYNECOLOGY

## 2024-01-03 PROCEDURE — 99999 PR OFFICE/OUTPT VISIT,PROCEDURE ONLY: CPT | Performed by: OBSTETRICS & GYNECOLOGY

## 2024-01-03 NOTE — PROGRESS NOTES
[facility] FETAL [facility]IC[facility]  [address] [place], [place] [address] [address]  Dept: [phone]  Loc: [phone]     1/3/2024    RE:  Ana Banda     : 1990   AGE: 33 y.o.     Dear Dr. Horton,    Thank you for allowing me to see Ana Banda.  As I'm sure you will recall, Ana Banda is a 33 y.o. [id number]'s last menstrual period was 2023. Estimated Date of Delivery: 24 at 34w1d seen in our office today for the following:    REASON FOR VISIT: Growth     Patient Active Problem List    Diagnosis Date Noted    29 weeks gestation of pregnancy 2023    Rh negative state in antepartum period, third trimester 2023    BMI 35.0-35.9,adult 2023    Fourth pregnancy 07/10/2023    Hx of  delivery, currently pregnant, second trimester 07/10/2023        PAST HISTORY:  OB History    Para Term  AB Living   4 2 1 1 1 2   SAB IAB Ectopic Molar Multiple Live Births   1         2      # Outcome Date GA Lbr Kentrell/2nd Weight Sex Delivery Anes PTL Lv   4 Current            3  09/15/19 35w0d  2.58 kg (5 lb 11 oz) M Vag-Spont   DIEGO   2 SAB 2019           1 Term 09/29/15   3.334 kg (7 lb 5.6 oz) F Vag-Spont   DIEGO      Obstetric Comments   No complications          MEDICAL:  Past Medical History:   Diagnosis Date    Asthma     Postpartum depression      delivery 9/15/19    Delivered second pregnancy at 35 weeks.        SURGICAL:  No past surgical history on file.    ALLERGIES:    No Known Allergies      MEDICATIONS:    Current Outpatient Medications   Medication Sig Dispense Refill    ondansetron (ZOFRAN) 4 MG tablet Take 1 tablet by mouth 3 times daily as needed for Nausea or Vomiting 30 tablet 0    Prenatal Vit-Fe Fumarate-FA (PRENATAL 1+1 PO) Take by mouth       No current facility-administered medications for this visit.        Social History     Socioeconomic History    Marital status:    Tobacco

## 2024-01-03 NOTE — PROGRESS NOTES
Patient is here today for growth. Denies any vaginal bleeding, cramping, or leakage of fluids.  Patient reports good fetal movement.

## 2024-01-18 ENCOUNTER — ROUTINE PRENATAL (OUTPATIENT)
Age: 34
End: 2024-01-18
Payer: COMMERCIAL

## 2024-01-18 VITALS
SYSTOLIC BLOOD PRESSURE: 118 MMHG | WEIGHT: 212.8 LBS | DIASTOLIC BLOOD PRESSURE: 68 MMHG | HEART RATE: 86 BPM | BODY MASS INDEX: 35.41 KG/M2

## 2024-01-18 DIAGNOSIS — Z3A.36 36 WEEKS GESTATION OF PREGNANCY: Primary | ICD-10-CM

## 2024-01-18 DIAGNOSIS — Z34.83 ENCOUNTER FOR SUPERVISION OF OTHER NORMAL PREGNANCY IN THIRD TRIMESTER: ICD-10-CM

## 2024-01-18 LAB
GLUCOSE URINE, POC: NEGATIVE
PROTEIN UA: ABNORMAL

## 2024-01-18 PROCEDURE — 0502F SUBSEQUENT PRENATAL CARE: CPT | Performed by: OBSTETRICS & GYNECOLOGY

## 2024-01-18 PROCEDURE — 81002 URINALYSIS NONAUTO W/O SCOPE: CPT | Performed by: OBSTETRICS & GYNECOLOGY

## 2024-01-18 SDOH — ECONOMIC STABILITY: INCOME INSECURITY: HOW HARD IS IT FOR YOU TO PAY FOR THE VERY BASICS LIKE FOOD, HOUSING, MEDICAL CARE, AND HEATING?: NOT HARD AT ALL

## 2024-01-18 SDOH — ECONOMIC STABILITY: HOUSING INSECURITY
IN THE LAST 12 MONTHS, WAS THERE A TIME WHEN YOU DID NOT HAVE A STEADY PLACE TO SLEEP OR SLEPT IN A SHELTER (INCLUDING NOW)?: NO

## 2024-01-18 SDOH — ECONOMIC STABILITY: FOOD INSECURITY: WITHIN THE PAST 12 MONTHS, THE FOOD YOU BOUGHT JUST DIDN'T LAST AND YOU DIDN'T HAVE MONEY TO GET MORE.: NEVER TRUE

## 2024-01-18 SDOH — ECONOMIC STABILITY: FOOD INSECURITY: WITHIN THE PAST 12 MONTHS, YOU WORRIED THAT YOUR FOOD WOULD RUN OUT BEFORE YOU GOT MONEY TO BUY MORE.: NEVER TRUE

## 2024-01-18 ASSESSMENT — PATIENT HEALTH QUESTIONNAIRE - PHQ9
SUM OF ALL RESPONSES TO PHQ QUESTIONS 1-9: 0
1. LITTLE INTEREST OR PLEASURE IN DOING THINGS: 0
2. FEELING DOWN, DEPRESSED OR HOPELESS: 0
SUM OF ALL RESPONSES TO PHQ QUESTIONS 1-9: 0
SUM OF ALL RESPONSES TO PHQ9 QUESTIONS 1 & 2: 0

## 2024-01-18 NOTE — PROGRESS NOTES
Pt here to be seen for routine prenatal visit. Denies cramping bleeding or leaking of fluids. Out of nausea medication and would like refill of Zofran sent to [facility] on [address]. Urine specimen trace protein and negative for glucose.

## 2024-01-21 LAB
CULTURE: NORMAL
SPECIMEN DESCRIPTION: NORMAL

## 2024-01-22 LAB
CULTURE: NORMAL
SPECIMEN DESCRIPTION: NORMAL

## 2024-01-25 ENCOUNTER — ROUTINE PRENATAL (OUTPATIENT)
Age: 34
End: 2024-01-25
Payer: COMMERCIAL

## 2024-01-25 VITALS
WEIGHT: 214.3 LBS | SYSTOLIC BLOOD PRESSURE: 109 MMHG | BODY MASS INDEX: 35.66 KG/M2 | DIASTOLIC BLOOD PRESSURE: 75 MMHG | HEART RATE: 91 BPM

## 2024-01-25 DIAGNOSIS — Z3A.37 37 WEEKS GESTATION OF PREGNANCY: Primary | ICD-10-CM

## 2024-01-25 PROBLEM — O09.892 HX OF PRETERM DELIVERY, CURRENTLY PREGNANT, SECOND TRIMESTER: Status: RESOLVED | Noted: 2023-07-10 | Resolved: 2024-01-25

## 2024-01-25 PROBLEM — Z3A.34 34 WEEKS GESTATION OF PREGNANCY: Status: RESOLVED | Noted: 2023-11-29 | Resolved: 2024-01-25

## 2024-01-25 LAB
GLUCOSE URINE, POC: NEGATIVE
PROTEIN UA: ABNORMAL

## 2024-01-25 PROCEDURE — 81002 URINALYSIS NONAUTO W/O SCOPE: CPT | Performed by: OBSTETRICS & GYNECOLOGY

## 2024-01-25 PROCEDURE — 0502F SUBSEQUENT PRENATAL CARE: CPT | Performed by: OBSTETRICS & GYNECOLOGY

## 2024-01-25 NOTE — PROGRESS NOTES
Good FM.  No BLD or LOF.  Emotionally doing well.  GBS was negative.  Would like to wait to at least 40 weeks.

## 2024-01-25 NOTE — PROGRESS NOTES
Patient presents for a routine prenatal exam. She denies any spotting, cramping, and/or leaking of fluid. Reports swelling in ankles as well as feet. Pt reports swelling decreases with lower extremity elevation. No other concerns at this time.

## 2024-02-01 ENCOUNTER — ROUTINE PRENATAL (OUTPATIENT)
Age: 34
End: 2024-02-01
Payer: COMMERCIAL

## 2024-02-01 VITALS
DIASTOLIC BLOOD PRESSURE: 79 MMHG | BODY MASS INDEX: 35.61 KG/M2 | WEIGHT: 214 LBS | SYSTOLIC BLOOD PRESSURE: 122 MMHG | HEART RATE: 91 BPM

## 2024-02-01 DIAGNOSIS — Z34.83 ENCOUNTER FOR SUPERVISION OF OTHER NORMAL PREGNANCY IN THIRD TRIMESTER: Primary | ICD-10-CM

## 2024-02-01 LAB
GLUCOSE URINE, POC: NORMAL
PROTEIN UA: POSITIVE

## 2024-02-01 PROCEDURE — 0502F SUBSEQUENT PRENATAL CARE: CPT | Performed by: OBSTETRICS & GYNECOLOGY

## 2024-02-01 PROCEDURE — 81002 URINALYSIS NONAUTO W/O SCOPE: CPT | Performed by: OBSTETRICS & GYNECOLOGY

## 2024-02-01 NOTE — PROGRESS NOTES
Good FM.  No BLD LOF no SS PIH.  Emotionally doing well.  Will examine cervix next week and determine game plan.  Mom is now in town.

## 2024-02-01 NOTE — PROGRESS NOTES
Patient is here today for prenatal visit. Denies any vaginal bleeding,  or leakage of fluids.  Had some contractions the other night but went away.   Patient reports good fetal movement.

## 2024-02-08 ENCOUNTER — ROUTINE PRENATAL (OUTPATIENT)
Age: 34
End: 2024-02-08
Payer: COMMERCIAL

## 2024-02-08 VITALS
DIASTOLIC BLOOD PRESSURE: 78 MMHG | SYSTOLIC BLOOD PRESSURE: 117 MMHG | BODY MASS INDEX: 35.86 KG/M2 | HEART RATE: 80 BPM | WEIGHT: 215.5 LBS

## 2024-02-08 DIAGNOSIS — Z3A.39 39 WEEKS GESTATION OF PREGNANCY: Primary | ICD-10-CM

## 2024-02-08 LAB
GLUCOSE URINE, POC: NEGATIVE
PROTEIN UA: ABNORMAL

## 2024-02-08 PROCEDURE — 0502F SUBSEQUENT PRENATAL CARE: CPT | Performed by: OBSTETRICS & GYNECOLOGY

## 2024-02-08 PROCEDURE — 81002 URINALYSIS NONAUTO W/O SCOPE: CPT | Performed by: OBSTETRICS & GYNECOLOGY

## 2024-02-08 NOTE — PROGRESS NOTES
Good FM.  No BLD or LOF.  Occasional CTN's.  Does not want membrane strip today as she and her son are both sick and does not want to bring baby home to that.  Will reevaluate next week and plan on IOL.

## 2024-02-08 NOTE — PROGRESS NOTES
Pt here for prenatal routine visit.  Denies cramping bleeding or leaking of fluids.  Reports good fetal movements.   Denies any problems or concerns.

## 2024-02-14 ENCOUNTER — HOSPITAL ENCOUNTER (INPATIENT)
Age: 34
LOS: 1 days | Discharge: HOME | End: 2024-02-15
Attending: STUDENT IN AN ORGANIZED HEALTH CARE EDUCATION/TRAINING PROGRAM | Admitting: STUDENT IN AN ORGANIZED HEALTH CARE EDUCATION/TRAINING PROGRAM
Payer: COMMERCIAL

## 2024-02-14 PROBLEM — Z3A.40 40 WEEKS GESTATION OF PREGNANCY: Status: ACTIVE | Noted: 2024-02-14

## 2024-02-14 LAB
ABO + RH BLD: NORMAL
AMPHET UR QL SCN: NEGATIVE
ANTIBODY IDENTIFICATION: NORMAL
ARM BAND NUMBER: NORMAL
BARBITURATES UR QL SCN: NEGATIVE
BENZODIAZ UR QL: NEGATIVE
BLOOD BANK SAMPLE EXPIRATION: NORMAL
BLOOD GROUP ANTIBODIES SERPL: POSITIVE
BUPRENORPHINE UR QL: NEGATIVE
CANNABINOIDS UR QL SCN: NEGATIVE
COCAINE UR QL SCN: NEGATIVE
DAT POLY-SP REAG RBC QL: NEGATIVE
ERYTHROCYTE [DISTWIDTH] IN BLOOD BY AUTOMATED COUNT: 13.4 % (ref 11.5–15)
ERYTHROCYTE [DISTWIDTH] IN BLOOD BY AUTOMATED COUNT: 13.5 % (ref 11.5–15)
FENTANYL UR QL: NEGATIVE
HCT VFR BLD AUTO: 40.7 % (ref 34–48)
HCT VFR BLD AUTO: 41.6 % (ref 34–48)
HGB BLD-MCNC: 13.4 G/DL (ref 11.5–15.5)
HGB BLD-MCNC: 14.1 G/DL (ref 11.5–15.5)
MCH RBC QN AUTO: 30 PG (ref 26–35)
MCH RBC QN AUTO: 30.3 PG (ref 26–35)
MCHC RBC AUTO-ENTMCNC: 32.9 G/DL (ref 32–34.5)
MCHC RBC AUTO-ENTMCNC: 33.9 G/DL (ref 32–34.5)
MCV RBC AUTO: 89.3 FL (ref 80–99.9)
MCV RBC AUTO: 91.1 FL (ref 80–99.9)
METHADONE UR QL: NEGATIVE
OPIATES UR QL SCN: NEGATIVE
OXYCODONE UR QL SCN: NEGATIVE
PCP UR QL SCN: NEGATIVE
PLATELET # BLD AUTO: 179 K/UL (ref 130–450)
PLATELET # BLD AUTO: 189 K/UL (ref 130–450)
PMV BLD AUTO: 12.3 FL (ref 7–12)
PMV BLD AUTO: 12.5 FL (ref 7–12)
RBC # BLD AUTO: 4.47 M/UL (ref 3.5–5.5)
RBC # BLD AUTO: 4.66 M/UL (ref 3.5–5.5)
RESULT: NEGATIVE
TEST INFORMATION: NORMAL
WBC OTHER # BLD: 10.7 K/UL (ref 4.5–11.5)
WBC OTHER # BLD: 14.9 K/UL (ref 4.5–11.5)

## 2024-02-14 PROCEDURE — 7200000001 HC VAGINAL DELIVERY

## 2024-02-14 PROCEDURE — 86901 BLOOD TYPING SEROLOGIC RH(D): CPT

## 2024-02-14 PROCEDURE — 80307 DRUG TEST PRSMV CHEM ANLYZR: CPT

## 2024-02-14 PROCEDURE — 85027 COMPLETE CBC AUTOMATED: CPT

## 2024-02-14 PROCEDURE — APPNB30 APP NON BILLABLE TIME 0-30 MINS: Performed by: ADVANCED PRACTICE MIDWIFE

## 2024-02-14 PROCEDURE — 6360000002 HC RX W HCPCS: Performed by: ADVANCED PRACTICE MIDWIFE

## 2024-02-14 PROCEDURE — 6370000000 HC RX 637 (ALT 250 FOR IP)

## 2024-02-14 PROCEDURE — 86900 BLOOD TYPING SEROLOGIC ABO: CPT

## 2024-02-14 PROCEDURE — 85461 HEMOGLOBIN FETAL: CPT

## 2024-02-14 PROCEDURE — 6360000002 HC RX W HCPCS

## 2024-02-14 PROCEDURE — 86870 RBC ANTIBODY IDENTIFICATION: CPT

## 2024-02-14 PROCEDURE — 59400 OBSTETRICAL CARE: CPT | Performed by: ADVANCED PRACTICE MIDWIFE

## 2024-02-14 PROCEDURE — 0UQGXZZ REPAIR VAGINA, EXTERNAL APPROACH: ICD-10-PCS | Performed by: STUDENT IN AN ORGANIZED HEALTH CARE EDUCATION/TRAINING PROGRAM

## 2024-02-14 PROCEDURE — 10907ZC DRAINAGE OF AMNIOTIC FLUID, THERAPEUTIC FROM PRODUCTS OF CONCEPTION, VIA NATURAL OR ARTIFICIAL OPENING: ICD-10-PCS | Performed by: STUDENT IN AN ORGANIZED HEALTH CARE EDUCATION/TRAINING PROGRAM

## 2024-02-14 PROCEDURE — 1220000000 HC SEMI PRIVATE OB R&B

## 2024-02-14 PROCEDURE — 6360000002 HC RX W HCPCS: Performed by: STUDENT IN AN ORGANIZED HEALTH CARE EDUCATION/TRAINING PROGRAM

## 2024-02-14 PROCEDURE — 86850 RBC ANTIBODY SCREEN: CPT

## 2024-02-14 PROCEDURE — 6370000000 HC RX 637 (ALT 250 FOR IP): Performed by: ADVANCED PRACTICE MIDWIFE

## 2024-02-14 PROCEDURE — 86880 COOMBS TEST DIRECT: CPT

## 2024-02-14 RX ORDER — SODIUM CHLORIDE, SODIUM LACTATE, POTASSIUM CHLORIDE, AND CALCIUM CHLORIDE .6; .31; .03; .02 G/100ML; G/100ML; G/100ML; G/100ML
500 INJECTION, SOLUTION INTRAVENOUS PRN
Status: DISCONTINUED | OUTPATIENT
Start: 2024-02-14 | End: 2024-02-14

## 2024-02-14 RX ORDER — ONDANSETRON 4 MG/1
8 TABLET, ORALLY DISINTEGRATING ORAL EVERY 8 HOURS PRN
Status: DISCONTINUED | OUTPATIENT
Start: 2024-02-14 | End: 2024-02-15 | Stop reason: HOSPADM

## 2024-02-14 RX ORDER — METHYLERGONOVINE MALEATE 0.2 MG/ML
200 INJECTION INTRAVENOUS PRN
OUTPATIENT
Start: 2024-02-14

## 2024-02-14 RX ORDER — SODIUM CHLORIDE 0.9 % (FLUSH) 0.9 %
5-40 SYRINGE (ML) INJECTION PRN
Status: DISCONTINUED | OUTPATIENT
Start: 2024-02-14 | End: 2024-02-15 | Stop reason: HOSPADM

## 2024-02-14 RX ORDER — MODIFIED LANOLIN
OINTMENT (GRAM) TOPICAL PRN
Status: DISCONTINUED | OUTPATIENT
Start: 2024-02-14 | End: 2024-02-15 | Stop reason: HOSPADM

## 2024-02-14 RX ORDER — SODIUM CHLORIDE 9 MG/ML
INJECTION, SOLUTION INTRAVENOUS PRN
Status: DISCONTINUED | OUTPATIENT
Start: 2024-02-14 | End: 2024-02-15 | Stop reason: HOSPADM

## 2024-02-14 RX ORDER — SODIUM CHLORIDE, SODIUM LACTATE, POTASSIUM CHLORIDE, CALCIUM CHLORIDE 600; 310; 30; 20 MG/100ML; MG/100ML; MG/100ML; MG/100ML
INJECTION, SOLUTION INTRAVENOUS CONTINUOUS
Status: DISCONTINUED | OUTPATIENT
Start: 2024-02-14 | End: 2024-02-14

## 2024-02-14 RX ORDER — MISOPROSTOL 200 UG/1
800 TABLET ORAL PRN
OUTPATIENT
Start: 2024-02-14

## 2024-02-14 RX ORDER — ACETAMINOPHEN 500 MG
1000 TABLET ORAL EVERY 8 HOURS PRN
Status: DISCONTINUED | OUTPATIENT
Start: 2024-02-14 | End: 2024-02-15 | Stop reason: HOSPADM

## 2024-02-14 RX ORDER — CARBOPROST TROMETHAMINE 250 UG/ML
250 INJECTION, SOLUTION INTRAMUSCULAR PRN
OUTPATIENT
Start: 2024-02-14

## 2024-02-14 RX ORDER — SODIUM CHLORIDE 0.9 % (FLUSH) 0.9 %
5-40 SYRINGE (ML) INJECTION EVERY 12 HOURS SCHEDULED
Status: DISCONTINUED | OUTPATIENT
Start: 2024-02-14 | End: 2024-02-14

## 2024-02-14 RX ORDER — DOCUSATE SODIUM 100 MG/1
CAPSULE, LIQUID FILLED ORAL
Status: COMPLETED
Start: 2024-02-14 | End: 2024-02-14

## 2024-02-14 RX ORDER — ACETAMINOPHEN 650 MG
TABLET, EXTENDED RELEASE ORAL
Status: DISCONTINUED
Start: 2024-02-14 | End: 2024-02-14

## 2024-02-14 RX ORDER — SODIUM CHLORIDE 0.9 % (FLUSH) 0.9 %
5-40 SYRINGE (ML) INJECTION PRN
Status: DISCONTINUED | OUTPATIENT
Start: 2024-02-14 | End: 2024-02-14

## 2024-02-14 RX ORDER — FERROUS SULFATE 325(65) MG
325 TABLET ORAL EVERY OTHER DAY
Status: DISCONTINUED | OUTPATIENT
Start: 2024-02-14 | End: 2024-02-15 | Stop reason: HOSPADM

## 2024-02-14 RX ORDER — SODIUM CHLORIDE 9 MG/ML
25 INJECTION, SOLUTION INTRAVENOUS PRN
Status: DISCONTINUED | OUTPATIENT
Start: 2024-02-14 | End: 2024-02-14

## 2024-02-14 RX ORDER — DOCUSATE SODIUM 100 MG/1
100 CAPSULE, LIQUID FILLED ORAL 2 TIMES DAILY
Status: DISCONTINUED | OUTPATIENT
Start: 2024-02-14 | End: 2024-02-15 | Stop reason: HOSPADM

## 2024-02-14 RX ORDER — SODIUM CHLORIDE, SODIUM LACTATE, POTASSIUM CHLORIDE, AND CALCIUM CHLORIDE .6; .31; .03; .02 G/100ML; G/100ML; G/100ML; G/100ML
1000 INJECTION, SOLUTION INTRAVENOUS PRN
Status: DISCONTINUED | OUTPATIENT
Start: 2024-02-14 | End: 2024-02-14

## 2024-02-14 RX ORDER — IBUPROFEN 800 MG/1
800 TABLET ORAL EVERY 8 HOURS PRN
Status: DISCONTINUED | OUTPATIENT
Start: 2024-02-14 | End: 2024-02-15 | Stop reason: HOSPADM

## 2024-02-14 RX ORDER — SODIUM CHLORIDE 0.9 % (FLUSH) 0.9 %
5-40 SYRINGE (ML) INJECTION EVERY 12 HOURS SCHEDULED
Status: DISCONTINUED | OUTPATIENT
Start: 2024-02-14 | End: 2024-02-15 | Stop reason: HOSPADM

## 2024-02-14 RX ADMIN — Medication 166.7 ML: at 00:35

## 2024-02-14 RX ADMIN — HUMAN RHO(D) IMMUNE GLOBULIN 300 MCG: 1500 SOLUTION INTRAMUSCULAR; INTRAVENOUS at 12:45

## 2024-02-14 RX ADMIN — IBUPROFEN 800 MG: 800 TABLET, FILM COATED ORAL at 10:50

## 2024-02-14 RX ADMIN — IBUPROFEN 800 MG: 800 TABLET, FILM COATED ORAL at 18:45

## 2024-02-14 RX ADMIN — Medication 87.3 MILLI-UNITS/MIN: at 00:46

## 2024-02-14 RX ADMIN — DOCUSATE SODIUM 100 MG: 100 CAPSULE, LIQUID FILLED ORAL at 23:55

## 2024-02-14 RX ADMIN — Medication: at 07:31

## 2024-02-14 ASSESSMENT — PAIN SCALES - GENERAL
PAINLEVEL_OUTOF10: 3
PAINLEVEL_OUTOF10: 3

## 2024-02-14 NOTE — FLOWSHEET NOTE
Patient admitted to room 323 with grandmother of infant at bedside. Patient oriented to room and floor and instructed to call phone number provided or use call light PRN. Admission packet and infant safe sleep reviewed with verbalized understanding. Patient is now resting in bed and infant taken to nursery.

## 2024-02-14 NOTE — PROGRESS NOTES
40w1d presents to L&D for contractions that started around 2200 . Patient denies lof or vb. Perceives positive fetal movement. Placed on efm. SVE performed.  with a BB.

## 2024-02-14 NOTE — PROCEDURES
Department of Obstetrics and Gynecology  Spontaneous Vaginal Delivery Note    Patient:  Ana Banda     Admit Date:  2024 12:01 AM  Medical Record Number:  [id number]   Procedure Date: 2024 1:04 AM     Pre-delivery Diagnosis:  Term pregnancy and Spontaneous labor    Post-delivery Diagnosis:  Living  infant(s) and Male    Information for the patient's :  Shadi Banda [[id number]]                  Infant Wt:   Information for the patient's :  Shadi Banda [[id number]]         APGARS:     Information for the patient's :  Shadi Banda [[id number]]        Anesthesia:  none    Application and Delivery:  33 y.o.  female  at 40w1d admitted for active phase labor who progressed to complete and delivered Cephalic, right occiput anterior presentation via  @ 0021, under none anesthesia,  over an intact perineum with no episiotomy with a resulting 1st degree and periurethral laceration, without dystocia or complication, a Live Born Female infant, weighing 7# 9oz, 3440 grams, Meconium fluid at delivery, bulb suctioned on perineum. A nuchal cord was present and reduced.  A delayed cord clamping was performed for 60 seconds.  Spontaneous cry,  Apgar's 1 minute:  9; 5 minute:  9. The placenta was delivered with gentle traction and was noted to be intact, whole, and that the umbilical cord had three vessels noted. Episiotomy was not needed due to a spontaneous 1st degree (mucosa only) vaginal laceration and right periurethral.  Repair was necessary. performed  per routine with  Vicryl 3.0 suture.  Cervix, rectum, sphincter intact. Sponge, needle, and instrument counts correct x 2.    Delivery Summary:     Shadi Banda [[id number]]      Labor Events      Cervical Ripening Date/Time:        Rupture Date/Time:  24 00:15:00   Rupture Type: AROM  Fluid Color: Clear  Meconium Consistency: Moderate  Fluid Odor: None  Fluid Volume: Moderate  Induction: None  Augmentation:

## 2024-02-14 NOTE — LACTATION NOTE
Patient plans to exclusively pump. She has EBP set up at bedside from home. She has already pumped several times. Besides questions about flange sizing, no other questions or concerns at this time. Due to having previous experience, patient declines further educations. Breastfeeding booklet provided along with lactation number. Encouraged to call with any questions or concerns.    Oralia Gonzalez, CLS

## 2024-02-14 NOTE — H&P
CHIEF COMPLAINT:  came in with ctx since 10 pm, feeling pressure, no lof some bloody show. +FM    HISTORY OF PRESENT ILLNESS:      The patient is a 33 y.o. female at 40w1d.  OB History          4    Para   2    Term   1       1    AB   1    Living   2         SAB   1    IAB        Ectopic        Molar        Multiple        Live Births   2          Obstetric Comments   No complications           Patient presents with a chief complaint as above and is being admitted for active phase labor    Estimated Due Date: Estimated Date of Delivery: 24    PRENATAL CARE:    Complicated by: 3rd baby 1 pre-term    PAST OB HISTORY  OB History          4    Para   2    Term   1       1    AB   1    Living   2         SAB   1    IAB        Ectopic        Molar        Multiple        Live Births   2          Obstetric Comments   No complications               Past Medical History:        Diagnosis Date    Asthma     Postpartum depression      delivery 9/15/19    Delivered second pregnancy at 35 weeks.     Past Surgical History:    History reviewed. No pertinent surgical history.  Allergies:  Patient has no known allergies.  Social History:    Social History     Socioeconomic History    Marital status:      Spouse name: Not on file    Number of children: Not on file    Years of education: Not on file    Highest education level: Not on file   Occupational History    Not on file   Tobacco Use    Smoking status: Never    Smokeless tobacco: Never   Vaping Use    Vaping Use: Never used   Substance and Sexual Activity    Alcohol use: Not Currently    Drug use: Never    Sexual activity: Yes     Partners: Male   Other Topics Concern    Not on file   Social History Narrative    Not on file     Social Determinants of Health     Financial Resource Strain: Low Risk  (2024)    Overall Financial Resource Strain (CARDIA)     Difficulty of Paying Living Expenses: Not hard at all   Food

## 2024-02-14 NOTE — PROGRESS NOTES
of viable male at 0021 by Mayela PEDERSEN. Delayed cord clamping completed. Infant placed skin to skin with mother. APGARS 9/9

## 2024-02-15 VITALS
OXYGEN SATURATION: 96 % | TEMPERATURE: 97.4 F | RESPIRATION RATE: 16 BRPM | HEART RATE: 73 BPM | SYSTOLIC BLOOD PRESSURE: 127 MMHG | DIASTOLIC BLOOD PRESSURE: 73 MMHG

## 2024-02-15 LAB
COMPONENT: NORMAL
STATUS OF UNITS: NORMAL
TRANSFUSION STATUS: NORMAL
UNIT DIVISION: 0
UNIT NUMBER: NORMAL

## 2024-02-15 PROCEDURE — 6370000000 HC RX 637 (ALT 250 FOR IP): Performed by: ADVANCED PRACTICE MIDWIFE

## 2024-02-15 PROCEDURE — 99024 POSTOP FOLLOW-UP VISIT: CPT | Performed by: STUDENT IN AN ORGANIZED HEALTH CARE EDUCATION/TRAINING PROGRAM

## 2024-02-15 RX ORDER — IBUPROFEN 800 MG/1
800 TABLET ORAL EVERY 8 HOURS PRN
Qty: 60 TABLET | Refills: 0 | Status: SHIPPED | OUTPATIENT
Start: 2024-02-15

## 2024-02-15 RX ADMIN — IBUPROFEN 800 MG: 800 TABLET, FILM COATED ORAL at 05:38

## 2024-02-15 ASSESSMENT — PAIN SCALES - GENERAL: PAINLEVEL_OUTOF10: 3

## 2024-02-15 NOTE — DISCHARGE SUMMARY
Obstetrical Discharge Form         Patients Name  Ana Banda    Gestational Age:  40w1d    Antepartum complications: none    Date of Delivery:   2024       12:21 AM      Type of Delivery:   Vaginal, Spontaneous [250]   Rupture Date/time:    No data found      No data found     Presentation:    Vertex [1]   Position:   Occiput [1]                   Anesthesia:    Epidural [254]     Feeding method:         Delivered By:   SANTY DO     Baby:       Information for the patient's :  Shadi Banda [[id number]]        Intrapartum complications: None  Postpartum complications: none  Discharge Date:   2/15/2024  Discharge Condition: Stable  Disposition:   Home    Plan:   Follow up    in 6 weeks    Patient presented to L&D in active labor at 8-9 CM and quickly progressed to complete and delivered a baby boy. No issues. She is pumping  On day 1 (32 hours after delivery at 1 AM) patient would like to go home  She would like the baby circumcised

## 2024-02-15 NOTE — LACTATION NOTE
Mom continues to pump breast milk and provide formula for baby.  Encouraged mom to let us know if she wants to try direct breastfeeding baby.  Encouraged her to call us.

## 2024-02-15 NOTE — PROGRESS NOTES
[facility] [place] Hearing screening results were discussed with parent. Questions answered. Brochure given to parent. Advised to monitor developmental milestones and contact physician for any concerns.   Shreya Riggs, AuD

## 2024-02-15 NOTE — PLAN OF CARE
Problem: Vaginal Birth or  Section  Goal: Fetal and maternal status remain reassuring during the birth process  Description:  Birth OB-Pregnancy care plan goal which identifies if the fetal and maternal status remain reassuring during the birth process  Outcome: Progressing     Problem: Postpartum  Goal: Experiences normal postpartum course  Description:  Postpartum OB-Pregnancy care plan goal which identifies if the mother is experiencing a normal postpartum course  Outcome: Progressing  Goal: Appropriate maternal -  bonding  Description:  Postpartum OB-Pregnancy care plan goal which identifies if the mother and  are bonding appropriately  Outcome: Progressing  Goal: Establishment of infant feeding pattern  Description:  Postpartum OB-Pregnancy care plan goal which identifies if the mother is establishing a feeding pattern with their   Outcome: Progressing  Goal: Incisions, wounds, or drain sites healing without S/S of infection  Outcome: Progressing     Problem: Pain  Goal: Verbalizes/displays adequate comfort level or baseline comfort level  Outcome: Progressing  Flowsheets (Taken 2/15/2024 0000)  Verbalizes/displays adequate comfort level or baseline comfort level: Assess pain using appropriate pain scale

## 2024-02-15 NOTE — DISCHARGE INSTRUCTIONS
Follow-up with your OB doctor  in  6 weeks for vaginal delivery unless otherwise instructed.   Call office for an appointment.    For breastfeeding support, you can contact our lactation specialists at [phone] or [phone]    DIET  Eat a well balanced diet focusing on foods high in fiber and protein  Drink plenty of fluids especially water.  To avoid constipation you may take a mild stool softener as recommended by your doctor or midwife.    ACTIVITY  Gradually increase your activity.  Resume exercise regimen only after advised by your doctor or midwife.  Avoid lifting anything heavier than your baby or a gallon of milk for SIX weeks.   Avoid driving until your doctor or midwife has given their approval.  Rise slowly from a lying to sitting and then a standing position.  Climb stairs one at a time.  Use caution when carrying your baby up and down the stairs.  No sexual activity for 6 weeks or until advised by your doctor - Nothing in vagina: intercourse, tampons, or douching.   Be prepared to discuss family planning at your follow-up OB visit.   You may feel tired or have a lack of energy.  You may continue your prenatal vitamin to replenish nutrients post delivery.  Nap when baby naps to catch up on sleep.  May return to work or school in 6 weeks or as directed by OB.     EMOTIONS  You may feed tobin, sad, teary, & overwhelmed.  Contact your OB provider if you feel you may be showing signs of postpartum depression, or have thoughts of harming yourself or your infant.  If infant will not stop crying, contact another adult for help or place infant in their crib on their back and take a break.  NEVER shake your infant.      BLEEDING  Vaginal bleeding will decrease in amount over the next few weeks.  You will notice that as your activity increases, your flow may increase.  This is your body's way of telling you, you need to take things easier and rest more often.  Call your OB/ER if you are saturating more than

## 2024-02-22 NOTE — PROGRESS NOTES
CLINICAL PHARMACY NOTE: MEDS TO BEDS    Total # of Prescriptions Filled: 1   The following medications were delivered to the patient:  Ibu 800 mg    Additional Documentation:

## 2024-03-28 ENCOUNTER — POSTPARTUM VISIT (OUTPATIENT)
Age: 34
End: 2024-03-28

## 2024-03-28 VITALS
DIASTOLIC BLOOD PRESSURE: 73 MMHG | HEIGHT: 65 IN | WEIGHT: 192.02 LBS | HEART RATE: 60 BPM | BODY MASS INDEX: 31.99 KG/M2 | SYSTOLIC BLOOD PRESSURE: 107 MMHG

## 2024-03-28 DIAGNOSIS — N89.8 VAGINAL DISCHARGE: ICD-10-CM

## 2024-03-28 PROBLEM — O26.893 RH NEGATIVE STATE IN ANTEPARTUM PERIOD, THIRD TRIMESTER: Status: RESOLVED | Noted: 2023-11-29 | Resolved: 2024-03-28

## 2024-03-28 PROBLEM — Z67.91 RH NEGATIVE STATE IN ANTEPARTUM PERIOD, THIRD TRIMESTER: Status: RESOLVED | Noted: 2023-11-29 | Resolved: 2024-03-28

## 2024-03-28 PROBLEM — Z3A.40 40 WEEKS GESTATION OF PREGNANCY: Status: RESOLVED | Noted: 2024-02-14 | Resolved: 2024-03-28

## 2024-03-28 PROCEDURE — 0503F POSTPARTUM CARE VISIT: CPT | Performed by: OBSTETRICS & GYNECOLOGY

## 2024-03-28 RX ORDER — SERTRALINE HYDROCHLORIDE 25 MG/1
25 TABLET, FILM COATED ORAL DAILY
COMMUNITY

## 2024-03-28 ASSESSMENT — ENCOUNTER SYMPTOMS
BLOOD IN STOOL: 0
VOMITING: 0
ABDOMINAL PAIN: 0
COUGH: 0
CONSTIPATION: 0
SHORTNESS OF BREATH: 0
WHEEZING: 0
DIARRHEA: 0
NAUSEA: 0

## 2024-03-28 NOTE — PROGRESS NOTES
Ana Banda is a 33-year-old G4, P3 female who had an  on 2024 at 40 weeks 1 day delivering a 7 pound 9.3 ounce male.  Reports no change in PMH, PSH, FH or allergy history.  FOB is involved and she feels safe with him.  She is not returning to work.  She is pumping.  Declines contraception.  Reports a laceration requiring sutures somewhat sore.  History of postpartum depression and began having symptoms so saw her PCP who started her on Zoloft which is controlling her symptoms.  She denies any self-harm or suicidal ideation.  She reports no past or present history of physical sexual or verbal abuse.  Pap 2023 negative with negative HPV.  Rich Breast Cancer Risk: Rich: 10.06 %      Patient presents for pp exam    Past Medical History:   Diagnosis Date    Asthma     Postpartum depression      delivery 9/15/19    Delivered second pregnancy at 35 weeks.     (spontaneous vaginal delivery) 2024        No past surgical history on file.     Family History   Problem Relation Age of Onset    Diabetes Father     Diabetes Paternal Grandmother           Current Outpatient Medications:     sertraline (ZOLOFT) 25 MG tablet, Take 1 tablet by mouth daily, Disp: , Rfl:     ibuprofen (ADVIL;MOTRIN) 800 MG tablet, Take 1 tablet by mouth every 8 hours as needed for Pain, Disp: 60 tablet, Rfl: 0    Prenatal Vit-Fe Fumarate-FA (PRENATAL 1+1 PO), Take by mouth, Disp: , Rfl:      No Known Allergies     Social History       Tobacco History       Smoking Status  Never      Smokeless Tobacco Use  Never              Alcohol History       Alcohol Use Status  Not Currently Drinks/Week  0 Glasses of wine, 0 Cans of beer, 0 Shots of liquor, 0 Drinks containing 0.5 oz of alcohol per week              Drug Use       Drug Use Status  Never              Sexual Activity       Sexually Active  Yes Partners  Male                     Review of Systems   Constitutional:  Negative for fever.   HENT:  Negative

## 2024-03-28 NOTE — PROGRESS NOTES
Pt here for postpartum exam.  Pt has not had menses since child birth.  Reports she is exclusively breast pumping.  Has no interest in discussing contraception at this time.  Was recently prescribed Sertraline by PCP for anxiety.  Pt reports this medication has been effective.

## 2024-03-29 DIAGNOSIS — N89.8 VAGINAL DISCHARGE: ICD-10-CM

## 2024-04-01 ENCOUNTER — TELEPHONE (OUTPATIENT)
Age: 34
End: 2024-04-01

## 2024-04-01 NOTE — TELEPHONE ENCOUNTER
Pt left VM on clinical line has question about test results, Health tracks shows BV, please review and advise.

## 2024-04-03 ENCOUNTER — TELEPHONE (OUTPATIENT)
Age: 34
End: 2024-04-03

## 2024-04-03 NOTE — TELEPHONE ENCOUNTER
----- Message from Cassi Horton DO sent at 4/3/2024  9:27 AM EDT -----  Culture returned positive for BV she is pumping but all treatments are compatible with breast-feeding I am sending in a prescription for MetroGel use nightly for 5 nights

## 2024-12-30 ENCOUNTER — TELEPHONE (OUTPATIENT)
Dept: OBGYN | Age: 34
End: 2024-12-30

## 2024-12-30 NOTE — TELEPHONE ENCOUNTER
LOV 3/28/2024 Sol. Patient LMP: 11/6/24. Patient wanted to see if dr wanted to order any labs to confirm prior to coming in for 8-12 week OB visit. Please advise patient [phone].

## 2024-12-31 DIAGNOSIS — N91.2 AMENORRHEA: Primary | ICD-10-CM

## 2025-01-06 ENCOUNTER — PATIENT MESSAGE (OUTPATIENT)
Dept: OBGYN | Age: 35
End: 2025-01-06

## 2025-01-06 DIAGNOSIS — O21.9 NAUSEA/VOMITING IN PREGNANCY: ICD-10-CM

## 2025-01-06 DIAGNOSIS — R30.0 DYSURIA DURING PREGNANCY IN FIRST TRIMESTER: Primary | ICD-10-CM

## 2025-01-06 DIAGNOSIS — O26.891 DYSURIA DURING PREGNANCY IN FIRST TRIMESTER: Primary | ICD-10-CM

## 2025-01-08 DIAGNOSIS — R30.0 DYSURIA DURING PREGNANCY IN FIRST TRIMESTER: Primary | ICD-10-CM

## 2025-01-08 DIAGNOSIS — O26.891 DYSURIA DURING PREGNANCY IN FIRST TRIMESTER: Primary | ICD-10-CM

## 2025-01-09 ENCOUNTER — TELEPHONE (OUTPATIENT)
Dept: OBGYN | Age: 35
End: 2025-01-09

## 2025-01-09 ENCOUNTER — HOSPITAL ENCOUNTER (OUTPATIENT)
Age: 35
Discharge: HOME | End: 2025-01-09
Payer: COMMERCIAL

## 2025-01-09 LAB
BILIRUB UR QL STRIP: NEGATIVE
CLARITY UR: CLEAR
COLOR UR: YELLOW
COMMENT: ABNORMAL
GLUCOSE UR STRIP-MCNC: NEGATIVE MG/DL
HGB UR QL STRIP.AUTO: NEGATIVE
KETONES UR STRIP-MCNC: NEGATIVE MG/DL
LEUKOCYTE ESTERASE UR QL STRIP: NEGATIVE
NITRITE UR QL STRIP: NEGATIVE
PH UR STRIP: 6 [PH] (ref 5–9)
PROT UR STRIP-MCNC: NEGATIVE MG/DL
SP GR UR STRIP: >1.03 (ref 1–1.03)
UROBILINOGEN UR STRIP-ACNC: 0.2 EU/DL (ref 0–1)

## 2025-01-09 PROCEDURE — 81003 URINALYSIS AUTO W/O SCOPE: CPT

## 2025-01-09 RX ORDER — PROMETHAZINE HYDROCHLORIDE 12.5 MG/1
12.5 TABLET ORAL 4 TIMES DAILY PRN
Qty: 40 TABLET | Refills: 0 | Status: SHIPPED | OUTPATIENT
Start: 2025-01-09

## 2025-01-09 RX ORDER — PROMETHAZINE HYDROCHLORIDE 12.5 MG/1
12.5 TABLET ORAL 4 TIMES DAILY PRN
Qty: 40 TABLET | Refills: 0 | Status: SHIPPED | OUTPATIENT
Start: 2025-01-09 | End: 2025-01-09

## 2025-01-09 NOTE — TELEPHONE ENCOUNTER
She states it is not working. She has tried them. It is mostly in the morning when she is trying to get her other kids ready in the morning. She would like the script called in.

## 2025-01-09 NOTE — TELEPHONE ENCOUNTER
----- Message from Dr. Cassi Horton, DO sent at 1/9/2025 10:39 AM EST -----  Please let her know that her urine is negative for any ketones that means she is not not dehydrated so what ever she is getting in is helpful is she having nausea and vomiting daily?  Multiple times a day?  Has she used the Unisom B6?  If desires I can start with Phenergan prescription.

## 2025-01-15 ENCOUNTER — INITIAL PRENATAL (OUTPATIENT)
Dept: OBGYN CLINIC | Age: 35
End: 2025-01-15
Payer: COMMERCIAL

## 2025-01-15 ENCOUNTER — ANCILLARY PROCEDURE (OUTPATIENT)
Dept: OBGYN CLINIC | Age: 35
End: 2025-01-15
Payer: COMMERCIAL

## 2025-01-15 VITALS
RESPIRATION RATE: 12 BRPM | TEMPERATURE: 98.2 F | DIASTOLIC BLOOD PRESSURE: 84 MMHG | BODY MASS INDEX: 37.77 KG/M2 | WEIGHT: 227 LBS | OXYGEN SATURATION: 100 % | SYSTOLIC BLOOD PRESSURE: 124 MMHG | HEART RATE: 87 BPM

## 2025-01-15 DIAGNOSIS — O36.80X0 ENCOUNTER TO DETERMINE FETAL VIABILITY OF PREGNANCY, SINGLE OR UNSPECIFIED FETUS: Primary | ICD-10-CM

## 2025-01-15 DIAGNOSIS — O99.210 OBESITY AFFECTING PREGNANCY, ANTEPARTUM, UNSPECIFIED OBESITY TYPE: ICD-10-CM

## 2025-01-15 PROCEDURE — 81002 URINALYSIS NONAUTO W/O SCOPE: CPT | Performed by: OBSTETRICS & GYNECOLOGY

## 2025-01-15 PROCEDURE — 76801 OB US < 14 WKS SINGLE FETUS: CPT | Performed by: OBSTETRICS & GYNECOLOGY

## 2025-01-15 PROCEDURE — 99203 OFFICE O/P NEW LOW 30 MIN: CPT | Performed by: OBSTETRICS & GYNECOLOGY

## 2025-01-15 PROCEDURE — 99999 PR OFFICE/OUTPT VISIT,PROCEDURE ONLY: CPT | Performed by: OBSTETRICS & GYNECOLOGY

## 2025-01-15 RX ORDER — ACETAMINOPHEN 160 MG
2000 TABLET,DISINTEGRATING ORAL DAILY
COMMUNITY

## 2025-01-15 NOTE — PATIENT INSTRUCTIONS
Patient Education        Learning About When to Call Your Doctor During Pregnancy (Up to 20 Weeks)  Overview     It's common to have concerns about what might be a problem during your pregnancy. Most pregnancies don't have any serious problems. But it's still important to know when to call your doctor if you have certain symptoms.  These are general suggestions. Your doctor may give you some more information about when to call.  When to call your doctor (up to 20 weeks)  Call 911 anytime you think you may need emergency care. For example, call if:  You have severe vaginal bleeding. This means you are soaking through a pad each hour for 2 or more hours.  You have chest pain, are short of breath, or cough up blood.  You have sudden, severe pain in your belly.  You passed out (lost consciousness).  Call your doctor now or seek immediate medical care if:  You have a fever.  You have vaginal bleeding.  You are dizzy or lightheaded, or you feel like you may faint.  You have signs of a blood clot in your leg (called a deep vein thrombosis), such as:  Pain in the calf, back of the knee, thigh, or groin.  Swelling in your leg or groin.  A color change on the leg or groin. The skin may be reddish or purplish, depending on your usual skin color.  You have symptoms of a urinary tract infection. These may include:  Pain or burning when you urinate.  A frequent need to urinate without being able to pass much urine.  Pain in the flank, which is just below the rib cage and above the waist on either side of the back.  Blood in your urine.  You have belly pain.  You think you are having contractions.  Watch closely for changes in your health, and be sure to contact your doctor if:  You have vaginal discharge that smells bad.  You feel sad, anxious, or hopeless for more than a few days.  You have other concerns about your pregnancy.  Follow-up care is a key part of your treatment and safety. Be sure to make and go to all appointments,  Universal Safety Interventions

## 2025-01-16 NOTE — PROGRESS NOTES
1/15/25    Sol Cassi, DO  [address]  [address] [place] [address]     RE:  ANA BANDA  : 1990   AGE: 34 y.o.      Dear Dr. Horton:    Ana Banda was scheduled for a pregnancy ultrasound assessment only.  A comprehensive ultrasound report is included under the imaging tab from today's date.    Transabdominal imaging of the intrauterine pregnancy was performed.  A living beltran intrauterine embryo was identified, with normal embryonic heart motion and normal embryonic motion noted.  The amniotic fluid volume was within normal limits.  A yolk sac was visualized.  The crown-rump length was consistent with an estimated gestational age of 8 weeks 1 day and an estimated date of delivery of 2025.  Visualization of the embryonic anatomy was limited secondary to the early gestational age.  There were no apparent adnexal masses.  The right ovary was not adequately visualized.  There was no significant free peritoneal fluid.  There were no apparent uterine abnormalities.    No further follow-up appointments have been scheduled in our office, however, we would be happy to see your patient at any time if you request.      A first trimester ultrasound assessment should be performed between 11 and 14 weeks gestational age.  A comprehensive ultrasound evaluation of the fetal anatomy should be performed at approximately 20 weeks gestational age.  A follow-up ultrasound evaluation in the third trimester is recommended to reassess fetal anatomy and growth, unless there is a clinical indication to perform the examination prior to that time.    If you have any questions regarding her management, please contact me at your convenience and thank you for allowing me to participate in her care    Sincerely,        Gadiel Dewitt MD, MS, FACOG, FACS, RDCS, RDMS, RVT  Director Maternal-Fetal Medicine  [facility]  [phone]

## 2025-01-29 ENCOUNTER — INITIAL PRENATAL (OUTPATIENT)
Dept: OBGYN | Age: 35
End: 2025-01-29
Payer: OTHER GOVERNMENT

## 2025-01-29 VITALS
WEIGHT: 225 LBS | BODY MASS INDEX: 37.44 KG/M2 | HEART RATE: 108 BPM | SYSTOLIC BLOOD PRESSURE: 138 MMHG | DIASTOLIC BLOOD PRESSURE: 85 MMHG

## 2025-01-29 DIAGNOSIS — Z3A.10 10 WEEKS GESTATION OF PREGNANCY: Primary | ICD-10-CM

## 2025-01-29 DIAGNOSIS — Z34.91 ENCOUNTER FOR SUPERVISION OF NORMAL PREGNANCY IN FIRST TRIMESTER, UNSPECIFIED GRAVIDITY: ICD-10-CM

## 2025-01-29 LAB
GLUCOSE URINE, POC: NEGATIVE MG/DL
PROTEIN UA: POSITIVE

## 2025-01-29 PROCEDURE — 99203 OFFICE O/P NEW LOW 30 MIN: CPT | Performed by: OBSTETRICS & GYNECOLOGY

## 2025-01-29 PROCEDURE — 81002 URINALYSIS NONAUTO W/O SCOPE: CPT | Performed by: OBSTETRICS & GYNECOLOGY

## 2025-01-29 PROCEDURE — 0500F INITIAL PRENATAL CARE VISIT: CPT | Performed by: OBSTETRICS & GYNECOLOGY

## 2025-01-29 RX ORDER — METOCLOPRAMIDE 10 MG/1
10 TABLET ORAL
Qty: 60 TABLET | Refills: 0 | Status: SHIPPED | OUTPATIENT
Start: 2025-01-29

## 2025-01-29 SDOH — ECONOMIC STABILITY: FOOD INSECURITY: WITHIN THE PAST 12 MONTHS, THE FOOD YOU BOUGHT JUST DIDN'T LAST AND YOU DIDN'T HAVE MONEY TO GET MORE.: NEVER TRUE

## 2025-01-29 SDOH — ECONOMIC STABILITY: FOOD INSECURITY: WITHIN THE PAST 12 MONTHS, YOU WORRIED THAT YOUR FOOD WOULD RUN OUT BEFORE YOU GOT MONEY TO BUY MORE.: NEVER TRUE

## 2025-01-29 ASSESSMENT — PATIENT HEALTH QUESTIONNAIRE - PHQ9
SUM OF ALL RESPONSES TO PHQ QUESTIONS 1-9: 0
1. LITTLE INTEREST OR PLEASURE IN DOING THINGS: NOT AT ALL
SUM OF ALL RESPONSES TO PHQ QUESTIONS 1-9: 0

## 2025-01-29 NOTE — PROGRESS NOTES
Here today for prenatal visit. Would like to switch from phenergan to zofran if possible. Discuss other options for her 1 hour glucose test when it comes time.   Urine for glucose and protein obtained  Discharge instructions have been discussed with the patient. Patient advised to call our office with any questions or concerns.   Voiced understanding.   Health tracks obtained, labeled and sent to lab

## 2025-01-29 NOTE — PROGRESS NOTES
Initial prenatal visit G5, P3 's 1  35 weeks.  Rh-.  FOB is involved.  Does not work outside the home.  Social: No TAD.  Moving to [place] by 2628 weeks.  Declined flu shot.  History of postpartum depression not on any meds currently.  Reviewed first trimester changes  Labs ordered  Health tracks for GC chlamydia  Last Pap  negative with negative HPV  Declined jordin

## 2025-01-30 DIAGNOSIS — Z34.91 ENCOUNTER FOR SUPERVISION OF NORMAL PREGNANCY IN FIRST TRIMESTER, UNSPECIFIED GRAVIDITY: ICD-10-CM

## 2025-01-30 DIAGNOSIS — Z3A.10 10 WEEKS GESTATION OF PREGNANCY: ICD-10-CM

## 2025-02-27 ENCOUNTER — ROUTINE PRENATAL (OUTPATIENT)
Dept: OBGYN | Age: 35
End: 2025-02-27
Payer: COMMERCIAL

## 2025-02-27 VITALS
DIASTOLIC BLOOD PRESSURE: 85 MMHG | BODY MASS INDEX: 37.61 KG/M2 | WEIGHT: 226 LBS | HEART RATE: 103 BPM | SYSTOLIC BLOOD PRESSURE: 133 MMHG

## 2025-02-27 DIAGNOSIS — O23.592 VAGINITIS AFFECTING PREGNANCY IN SECOND TRIMESTER, ANTEPARTUM: ICD-10-CM

## 2025-02-27 DIAGNOSIS — Z3A.14 14 WEEKS GESTATION OF PREGNANCY: Primary | ICD-10-CM

## 2025-02-27 LAB
GLUCOSE URINE, POC: NEGATIVE MG/DL
PROTEIN UA: NEGATIVE

## 2025-02-27 PROCEDURE — 0502F SUBSEQUENT PRENATAL CARE: CPT | Performed by: OBSTETRICS & GYNECOLOGY

## 2025-02-27 PROCEDURE — 81002 URINALYSIS NONAUTO W/O SCOPE: CPT | Performed by: OBSTETRICS & GYNECOLOGY

## 2025-02-27 PROCEDURE — 99213 OFFICE O/P EST LOW 20 MIN: CPT | Performed by: OBSTETRICS & GYNECOLOGY

## 2025-02-27 SDOH — ECONOMIC STABILITY: FOOD INSECURITY: WITHIN THE PAST 12 MONTHS, YOU WORRIED THAT YOUR FOOD WOULD RUN OUT BEFORE YOU GOT MONEY TO BUY MORE.: NEVER TRUE

## 2025-02-27 SDOH — ECONOMIC STABILITY: FOOD INSECURITY: WITHIN THE PAST 12 MONTHS, THE FOOD YOU BOUGHT JUST DIDN'T LAST AND YOU DIDN'T HAVE MONEY TO GET MORE.: NEVER TRUE

## 2025-02-27 NOTE — PROGRESS NOTES
Patient alert and pleasant with complaints of possible yeast infection. Used monistat otc for 2 days and stopped due to burning pain in vaginal area.   Here today for prenatal visit.  Urine for glucose and protein obtained with negative results.  Discharge instructions have been discussed with the patient. Patient advised to call our office with any questions or concerns.   Voiced understanding.   Health tracks obtained, labeled and sent to lab

## 2025-02-28 DIAGNOSIS — O23.592 VAGINITIS AFFECTING PREGNANCY IN SECOND TRIMESTER, ANTEPARTUM: ICD-10-CM

## 2025-03-04 ENCOUNTER — TELEPHONE (OUTPATIENT)
Dept: OBGYN | Age: 35
End: 2025-03-04

## 2025-03-04 NOTE — TELEPHONE ENCOUNTER
----- Message from Dr. Cassi Horton, DO sent at 3/4/2025 10:01 AM EST -----  Please let her know her culture was negative for any infection.  If she having any symptoms still if so with negative infection I would recommend Desitin A&Dointment coconut oil ice pack

## 2025-03-11 ENCOUNTER — TELEPHONE (OUTPATIENT)
Dept: OBGYN | Age: 35
End: 2025-03-11

## 2025-03-27 ENCOUNTER — ROUTINE PRENATAL (OUTPATIENT)
Dept: OBGYN | Age: 35
End: 2025-03-27
Payer: COMMERCIAL

## 2025-03-27 VITALS
SYSTOLIC BLOOD PRESSURE: 127 MMHG | RESPIRATION RATE: 18 BRPM | BODY MASS INDEX: 37.59 KG/M2 | WEIGHT: 225.6 LBS | OXYGEN SATURATION: 99 % | TEMPERATURE: 98.2 F | DIASTOLIC BLOOD PRESSURE: 82 MMHG | HEIGHT: 65 IN | HEART RATE: 104 BPM

## 2025-03-27 DIAGNOSIS — Z3A.18 18 WEEKS GESTATION OF PREGNANCY: Primary | ICD-10-CM

## 2025-03-27 LAB
GLUCOSE URINE, POC: NORMAL MG/DL
PROTEIN UA: NEGATIVE

## 2025-03-27 PROCEDURE — 81002 URINALYSIS NONAUTO W/O SCOPE: CPT | Performed by: OBSTETRICS & GYNECOLOGY

## 2025-03-27 PROCEDURE — 99213 OFFICE O/P EST LOW 20 MIN: CPT | Performed by: OBSTETRICS & GYNECOLOGY

## 2025-03-27 PROCEDURE — 0502F SUBSEQUENT PRENATAL CARE: CPT | Performed by: OBSTETRICS & GYNECOLOGY

## 2025-03-27 NOTE — PROGRESS NOTES
Concerned regarding anxiety/depression today.  Does have a past history of postpartum depression did use sertraline with good results.  Denies self-harm or suicidal ideation.  No desire to harm others.  He is interested in restarting the Zoloft.  Will get labs done.  Has FA US scheduled.

## 2025-04-14 ENCOUNTER — ANCILLARY PROCEDURE (OUTPATIENT)
Dept: OBGYN CLINIC | Age: 35
End: 2025-04-14
Payer: OTHER GOVERNMENT

## 2025-04-14 ENCOUNTER — INITIAL PRENATAL (OUTPATIENT)
Dept: OBGYN CLINIC | Age: 35
End: 2025-04-14
Payer: OTHER GOVERNMENT

## 2025-04-14 VITALS
BODY MASS INDEX: 37.65 KG/M2 | DIASTOLIC BLOOD PRESSURE: 79 MMHG | TEMPERATURE: 98.2 F | SYSTOLIC BLOOD PRESSURE: 114 MMHG | HEART RATE: 100 BPM | WEIGHT: 226 LBS | HEIGHT: 65 IN

## 2025-04-14 DIAGNOSIS — Z34.90 FOURTH PREGNANCY: ICD-10-CM

## 2025-04-14 DIAGNOSIS — Z36.3 ENCOUNTER FOR ANTENATAL SCREENING FOR MALFORMATION USING ULTRASOUND: Primary | ICD-10-CM

## 2025-04-14 DIAGNOSIS — O09.892 HX OF PRETERM DELIVERY, CURRENTLY PREGNANT, SECOND TRIMESTER: ICD-10-CM

## 2025-04-14 DIAGNOSIS — O99.210 OBESITY AFFECTING PREGNANCY, ANTEPARTUM, UNSPECIFIED OBESITY TYPE: ICD-10-CM

## 2025-04-14 DIAGNOSIS — Z36.3 ENCOUNTER FOR ROUTINE SCREENING FOR FETAL MALFORMATION USING ULTRASOUND: ICD-10-CM

## 2025-04-14 LAB
GLUCOSE URINE, POC: NORMAL MG/DL
PROTEIN UA: NEGATIVE

## 2025-04-14 PROCEDURE — 99203 OFFICE O/P NEW LOW 30 MIN: CPT | Performed by: OBSTETRICS & GYNECOLOGY

## 2025-04-14 PROCEDURE — 81002 URINALYSIS NONAUTO W/O SCOPE: CPT | Performed by: OBSTETRICS & GYNECOLOGY

## 2025-04-14 PROCEDURE — 76811 OB US DETAILED SNGL FETUS: CPT | Performed by: OBSTETRICS & GYNECOLOGY

## 2025-04-14 PROCEDURE — 99999 PR OFFICE/OUTPT VISIT,PROCEDURE ONLY: CPT | Performed by: OBSTETRICS & GYNECOLOGY

## 2025-04-14 NOTE — PROGRESS NOTES
25    SolCassi, DO  [address]  [address] [place] [address]     RE:  ANA BANDA  : 1990   AGE: 34 y.o.      Dear Dr. Horton:    Ana Banda was scheduled for a fetal ultrasound assessment only.  A comprehensive ultrasound report is included under the imaging tab from today's date.    A living beltran intrauterine fetus was identified in the cephalic presentation, with normal fetal heart motion and normal fetal motion noted.  The amniotic fluid volume was within normal limits.  The estimated fetal weight was 395 grams.  The placenta was on the anterior surface of the uterus.  The biometric measurements were consistent with the patient's established dating parameters, within the limits of error the test at the current gestational age.  No apparent gross fetal anatomic abnormalities were identified in the areas which were visualized.    Ultrasound is not diagnostic for fetal aneuploidy or other karyotype abnormalities, and may not detect all structural fetal defects, even if multiple examinations are performed during a  pregnancy.  Normal ultrasound findings did not equate with a normal fetal outcome.    The patient declined transvaginal ultrasound assessment of the cervix.    No further follow-up assessments have been scheduled in our office, however; we would be happy to see your patient at any time if you request.    A repeat scan in the third trimester is recommended to reassess fetal anatomy and growth, unless there is a clinical indication for repeat assessment prior to that time. Some fetal anomalies may not be apparent on the initial assessment but develop as the pregnancy progresses.     If you have any questions regarding her management, please contact me at your convenience and thank you for allowing me to participate in her care    Sincerely,        Gadiel Dewitt MD, MS, FACOG, FACS, RDCS, RDMS, RVT  Director Maternal-Fetal Medicine  [facility]  [phone]

## 2025-04-14 NOTE — PROGRESS NOTES
Patient is here today for anatomy scan. Denies any vaginal bleeding, cramping, or leakage of fluids.  Slight movement.

## 2025-04-19 ENCOUNTER — HOSPITAL ENCOUNTER (OUTPATIENT)
Age: 35
Discharge: HOME | End: 2025-04-19
Payer: OTHER GOVERNMENT

## 2025-04-19 DIAGNOSIS — Z34.91 ENCOUNTER FOR SUPERVISION OF NORMAL PREGNANCY IN FIRST TRIMESTER, UNSPECIFIED GRAVIDITY: ICD-10-CM

## 2025-04-19 DIAGNOSIS — Z3A.10 10 WEEKS GESTATION OF PREGNANCY: ICD-10-CM

## 2025-04-19 LAB
ABO + RH BLD: NORMAL
ERYTHROCYTE [DISTWIDTH] IN BLOOD BY AUTOMATED COUNT: 13.3 % (ref 11.5–15)
FERRITIN SERPL-MCNC: 40 NG/ML
HBA1C MFR BLD: 5.2 % (ref 4–5.6)
HBV SURFACE AG SERPL QL IA: NONREACTIVE
HCT VFR BLD AUTO: 39.1 % (ref 34–48)
HCV AB SERPL QL IA: NONREACTIVE
HGB BLD-MCNC: 13.1 G/DL (ref 11.5–15.5)
HIV 1+2 AB+HIV1 P24 AG SERPL QL IA: NONREACTIVE
MCH RBC QN AUTO: 29.4 PG (ref 26–35)
MCHC RBC AUTO-ENTMCNC: 33.5 G/DL (ref 32–34.5)
MCV RBC AUTO: 87.9 FL (ref 80–99.9)
PLATELET # BLD AUTO: 216 K/UL (ref 130–450)
PMV BLD AUTO: 10.6 FL (ref 7–12)
RBC # BLD AUTO: 4.45 M/UL (ref 3.5–5.5)
WBC OTHER # BLD: 11.8 K/UL (ref 4.5–11.5)

## 2025-04-19 PROCEDURE — 85027 COMPLETE CBC AUTOMATED: CPT

## 2025-04-19 PROCEDURE — 86762 RUBELLA ANTIBODY: CPT

## 2025-04-19 PROCEDURE — 86900 BLOOD TYPING SEROLOGIC ABO: CPT

## 2025-04-19 PROCEDURE — 87389 HIV-1 AG W/HIV-1&-2 AB AG IA: CPT

## 2025-04-19 PROCEDURE — 86803 HEPATITIS C AB TEST: CPT

## 2025-04-19 PROCEDURE — 83036 HEMOGLOBIN GLYCOSYLATED A1C: CPT

## 2025-04-19 PROCEDURE — 86592 SYPHILIS TEST NON-TREP QUAL: CPT

## 2025-04-19 PROCEDURE — 86901 BLOOD TYPING SEROLOGIC RH(D): CPT

## 2025-04-19 PROCEDURE — 82728 ASSAY OF FERRITIN: CPT

## 2025-04-19 PROCEDURE — 83020 HEMOGLOBIN ELECTROPHORESIS: CPT

## 2025-04-19 PROCEDURE — 87340 HEPATITIS B SURFACE AG IA: CPT

## 2025-04-19 PROCEDURE — 36415 COLL VENOUS BLD VENIPUNCTURE: CPT

## 2025-04-21 LAB
RPR SER QL: NONREACTIVE
RUBV IGG SERPL QL IA: NORMAL IU/ML

## 2025-04-22 LAB
HGB ELECTROPHORESIS INTERP: NORMAL
PATHOLOGIST: NORMAL

## 2025-04-24 ENCOUNTER — TELEPHONE (OUTPATIENT)
Dept: OBGYN | Age: 35
End: 2025-04-24

## 2025-04-24 NOTE — TELEPHONE ENCOUNTER
Same day cancellation prenatal visit for 4/24/25 due to son has bronchitis. Please advise patient for rescheduling recommendations [phone]

## 2025-04-29 ENCOUNTER — ROUTINE PRENATAL (OUTPATIENT)
Dept: OBGYN | Age: 35
End: 2025-04-29
Payer: COMMERCIAL

## 2025-04-29 VITALS
BODY MASS INDEX: 37.44 KG/M2 | DIASTOLIC BLOOD PRESSURE: 77 MMHG | HEART RATE: 96 BPM | WEIGHT: 225 LBS | SYSTOLIC BLOOD PRESSURE: 119 MMHG

## 2025-04-29 DIAGNOSIS — Z3A.23 23 WEEKS GESTATION OF PREGNANCY: Primary | ICD-10-CM

## 2025-04-29 DIAGNOSIS — Z34.82 ENCOUNTER FOR SUPERVISION OF OTHER NORMAL PREGNANCY IN SECOND TRIMESTER: ICD-10-CM

## 2025-04-29 LAB
GLUCOSE URINE, POC: NEGATIVE MG/DL
PROTEIN UA: POSITIVE

## 2025-04-29 PROCEDURE — 81002 URINALYSIS NONAUTO W/O SCOPE: CPT | Performed by: OBSTETRICS & GYNECOLOGY

## 2025-04-29 PROCEDURE — 99213 OFFICE O/P EST LOW 20 MIN: CPT | Performed by: OBSTETRICS & GYNECOLOGY

## 2025-04-29 PROCEDURE — 0502F SUBSEQUENT PRENATAL CARE: CPT | Performed by: OBSTETRICS & GYNECOLOGY

## 2025-04-29 NOTE — PROGRESS NOTES
Positive fetal movement no BLD or LOF.  Initial prenatal labs were performed 4-19.  Ordered 1 hour GTT.  Blood type AB- RhoGAM ordered would like to get that at [facility].  She will be moving after her next visit back to [place].  Reviewed obtaining fax number for release of records.  Call if needs an increase in Zoloft if she feels like she is not having at least a 70% reduction in her symptoms.

## 2025-05-01 ENCOUNTER — TELEPHONE (OUTPATIENT)
Dept: OBGYN | Age: 35
End: 2025-05-01

## 2025-05-01 NOTE — TELEPHONE ENCOUNTER
Patient calling to schedule prenatal visit around 5/27 per Dr Horton. No soon appointments to offer. Please contact patient to schedule.